# Patient Record
Sex: FEMALE | Race: WHITE | NOT HISPANIC OR LATINO | Employment: UNEMPLOYED | URBAN - METROPOLITAN AREA
[De-identification: names, ages, dates, MRNs, and addresses within clinical notes are randomized per-mention and may not be internally consistent; named-entity substitution may affect disease eponyms.]

---

## 2024-02-13 ENCOUNTER — OFFICE VISIT (OUTPATIENT)
Dept: UROLOGY | Facility: CLINIC | Age: 54
End: 2024-02-13
Payer: COMMERCIAL

## 2024-02-13 VITALS
BODY MASS INDEX: 34.84 KG/M2 | DIASTOLIC BLOOD PRESSURE: 62 MMHG | WEIGHT: 222 LBS | HEIGHT: 67 IN | HEART RATE: 68 BPM | SYSTOLIC BLOOD PRESSURE: 108 MMHG

## 2024-02-13 DIAGNOSIS — R30.0 DYSURIA: Primary | ICD-10-CM

## 2024-02-13 DIAGNOSIS — Z96.0 URETERAL STENT PRESENT: ICD-10-CM

## 2024-02-13 LAB
BILIRUB BLD-MCNC: ABNORMAL MG/DL
CLARITY, POC: ABNORMAL
COLOR UR: ABNORMAL
EXPIRATION DATE: 1224
GLUCOSE UR STRIP-MCNC: NEGATIVE MG/DL
KETONES UR QL: NEGATIVE
LEUKOCYTE EST, POC: ABNORMAL
Lab: ABNORMAL
NITRITE UR-MCNC: NEGATIVE MG/ML
PH UR: 6 [PH] (ref 5–8)
PROT UR STRIP-MCNC: ABNORMAL MG/DL
RBC # UR STRIP: ABNORMAL /UL
SP GR UR: 1.03 (ref 1–1.03)
URINE VOLUME: 0
UROBILINOGEN UR QL: ABNORMAL

## 2024-02-13 PROCEDURE — 87086 URINE CULTURE/COLONY COUNT: CPT | Performed by: NURSE PRACTITIONER

## 2024-02-13 RX ORDER — GABAPENTIN 600 MG/1
1 TABLET ORAL 3 TIMES DAILY
COMMUNITY
Start: 2023-12-18

## 2024-02-13 RX ORDER — ERGOCALCIFEROL 1.25 MG/1
1 CAPSULE ORAL WEEKLY
COMMUNITY

## 2024-02-13 RX ORDER — OXYCODONE HYDROCHLORIDE 10 MG/1
10 TABLET ORAL EVERY 24 HOURS
COMMUNITY
Start: 2024-01-19

## 2024-02-13 RX ORDER — BUDESONIDE AND FORMOTEROL FUMARATE DIHYDRATE 80; 4.5 UG/1; UG/1
2 AEROSOL RESPIRATORY (INHALATION) 2 TIMES DAILY
COMMUNITY

## 2024-02-13 RX ORDER — BUSPIRONE HYDROCHLORIDE 15 MG/1
1 TABLET ORAL 3 TIMES DAILY
COMMUNITY
Start: 2023-12-18

## 2024-02-13 RX ORDER — QUETIAPINE FUMARATE 50 MG/1
2 TABLET, FILM COATED ORAL
COMMUNITY

## 2024-02-13 RX ORDER — TIZANIDINE 4 MG/1
4 TABLET ORAL EVERY 8 HOURS SCHEDULED
COMMUNITY
Start: 2023-12-18

## 2024-02-13 RX ORDER — ALBUTEROL SULFATE 90 UG/1
2 AEROSOL, METERED RESPIRATORY (INHALATION) 4 TIMES DAILY PRN
COMMUNITY

## 2024-02-13 RX ORDER — IBUPROFEN 800 MG/1
800 TABLET ORAL EVERY 8 HOURS SCHEDULED
COMMUNITY
Start: 2023-09-05

## 2024-02-13 RX ORDER — TOPIRAMATE 100 MG/1
100 TABLET, FILM COATED ORAL 2 TIMES DAILY
COMMUNITY

## 2024-02-13 RX ORDER — PANTOPRAZOLE SODIUM 40 MG/1
1 TABLET, DELAYED RELEASE ORAL DAILY
COMMUNITY

## 2024-02-14 LAB — BACTERIA SPEC AEROBE CULT: NORMAL

## 2024-02-15 ENCOUNTER — TELEPHONE (OUTPATIENT)
Dept: UROLOGY | Facility: CLINIC | Age: 54
End: 2024-02-15
Payer: COMMERCIAL

## 2024-03-16 PROBLEM — Z85.41 HISTORY OF CERVICAL CANCER: Status: ACTIVE | Noted: 2024-03-16

## 2024-03-16 PROBLEM — N13.5 BILATERAL URETERAL OBSTRUCTION: Status: ACTIVE | Noted: 2024-03-16

## 2024-03-16 PROBLEM — N39.0 RECURRENT URINARY TRACT INFECTION: Status: ACTIVE | Noted: 2024-03-16

## 2024-03-16 NOTE — PROGRESS NOTES
Chief Complaint: Urologic complaint    Subjective         History of Present Illness  Nat Durán Case is a 54 y.o. 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  No fevers.  No hospitalizations for UTI    UTIs for the last 9 months.    Currently on Bactrim x 7 days for UTI    Nitrite positive urine today    No trouble with constipation.    PVR    3/24   106    No pelvic radiation.      1 year of recurrent UTIs    Strains to void at times    Symptoms - burning/dysuria/back pain/gross hematuria/frequency/urgency    1/4/2024 Dr. Rick exchange stents    8/23 0.9, GFR 74      Has been having stent exchanges every 4 months    2/22 CT abdomen/pelvis with - no signs of metastatic disease.  Bilateral right greater than left hydronephrosis.  Down to the urinary bladder.  No stones.    4/21 CT abdomen/pelvis without - negative for metastatic disease.  New moderate right hydronephrosis with moderate left hydronephrosis.  No stone or mass.    2019 bilateral ureteral stents -status post hysterectomy with hydro after.    No history of nephrolithiasis  No  family history    COPD, asthma  No anticoagulation  Former smoker      Urine culture    9/12/2023 less than 10,000 colony-forming's per mL mixed urogenital opal  8/22/2023 > 100,000 E. coli resistant to sulfonamide, tetracycline, otherwise sensitive     Op note 1/4/2024: Dr. 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          Bladder Scan interpretation 03/18/2024    Estimation of residual urine via VoAPPs 3000 Helpa Bladder  Scan  MA/nurse performing: RICCI Turner  Residual Urine: 106 ml  Indication: Recurrent urinary tract infection    Bilateral ureteral obstruction    History of cervical cancer   Position: Supine  Examination: Incremental scanning of the suprapubic area using 2.0 MHz transducer using copious amounts of acoustic gel.   Findings: An anechoic area was demonstrated which represented the bladder, with measurement of residual urine as noted. I inspected this myself. In that the residual urine was stable or insignificant, refer to plan for treatment and plan necessary at this time.            Objective     Past Medical History:   Diagnosis Date    Asthma     Back pain     COPD (chronic obstructive pulmonary disease)     Depression     Hip pain     Migraines     Urinary tract infection        Past Surgical History:   Procedure Laterality Date    ANKLE SURGERY Right     HYSTERECTOMY      WRIST SURGERY Left          Current Outpatient Medications:     albuterol sulfate HFA (ProAir HFA) 108 (90 Base) MCG/ACT inhaler, Inhale 2 puffs 4 (Four) Times a Day As Needed., Disp: , Rfl:     budesonide-formoterol (Symbicort) 80-4.5 MCG/ACT inhaler, Inhale 2 puffs 2 (Two) Times a Day., Disp: , Rfl:     busPIRone (BUSPAR) 15 MG tablet, Take 1 tablet by mouth 3 (Three) Times a Day., Disp: , Rfl:     Cariprazine HCl (Vraylar) 1.5 MG capsule capsule, Take 1 capsule by mouth Daily., Disp: , Rfl:     Cyanocobalamin (RA Vitamin B-12) 1000 MCG/ML liquid, Take 1,000 mcg by mouth Daily., Disp: , Rfl:     gabapentin (NEURONTIN) 600 MG tablet, Take 1 tablet by mouth 3 (Three) Times a Day., Disp: , Rfl:     ibuprofen (ADVIL,MOTRIN) 800 MG tablet, Take 1 tablet by mouth Every 8 (Eight) Hours., Disp: , Rfl:     oxyCODONE (ROXICODONE) 10 MG tablet, Take 1 tablet by mouth Daily., Disp: , Rfl:     pantoprazole (PROTONIX) 40 MG EC tablet, Take 1 tablet by mouth Daily., Disp: , Rfl:     QUEtiapine (SEROquel) 50 MG tablet, Take 2 tablets by mouth every night at  bedtime., Disp: , Rfl:     tiZANidine (ZANAFLEX) 4 MG tablet, Take 1 tablet by mouth Every 8 (Eight) Hours., Disp: , Rfl:     topiramate (TOPAMAX) 100 MG tablet, Take 1 tablet by mouth 2 (Two) Times a Day., Disp: , Rfl:     Vitamin D, Ergocalciferol, 19594 units capsule, Take 1 capsule by mouth 1 (One) Time Per Week., Disp: , Rfl:     No Known Allergies     No family history on file.    Social History     Socioeconomic History    Marital status: Legally    Tobacco Use    Smoking status: Former     Types: Cigarettes     Passive exposure: Past    Smokeless tobacco: Never   Vaping Use    Vaping status: Every Day       Vital Signs:   There were no vitals taken for this visit.     Physical exam    Alert and orient x3  Well appearing, well developed, in no acute distress   Unlabored respirations  Nontender/nondistended      Grossly oriented to person, place and time, judgment is intact, normal mood and affect              Assessment and Plan    Diagnoses and all orders for this visit:    1. Recurrent urinary tract infection (Primary)    2. Bilateral ureteral obstruction      Records reviewed today and summarized in the chart    Drinks 6 to 8 glasses of water daily    Counseled to take daily cranberry tablet and consider starting d-mannose    Urine culture    Bactrim DS p.o. BID x 2 weeks.  Risk benefits discussed    CT urology protocol    Follow-up after CT

## 2024-03-18 ENCOUNTER — OFFICE VISIT (OUTPATIENT)
Dept: UROLOGY | Facility: CLINIC | Age: 54
End: 2024-03-18
Payer: COMMERCIAL

## 2024-03-18 VITALS — WEIGHT: 222 LBS | BODY MASS INDEX: 34.84 KG/M2 | HEIGHT: 67 IN | RESPIRATION RATE: 16 BRPM

## 2024-03-18 DIAGNOSIS — Z85.41 HISTORY OF CERVICAL CANCER: ICD-10-CM

## 2024-03-18 DIAGNOSIS — N39.0 RECURRENT URINARY TRACT INFECTION: Primary | ICD-10-CM

## 2024-03-18 DIAGNOSIS — N13.5 BILATERAL URETERAL OBSTRUCTION: ICD-10-CM

## 2024-03-18 LAB
BILIRUB BLD-MCNC: ABNORMAL MG/DL
CLARITY, POC: ABNORMAL
COLOR UR: ABNORMAL
EXPIRATION DATE: ABNORMAL
GLUCOSE UR STRIP-MCNC: ABNORMAL MG/DL
KETONES UR QL: NEGATIVE
LEUKOCYTE EST, POC: ABNORMAL
Lab: ABNORMAL
NITRITE UR-MCNC: POSITIVE MG/ML
PH UR: 5.5 [PH] (ref 5–8)
PROT UR STRIP-MCNC: ABNORMAL MG/DL
RBC # UR STRIP: ABNORMAL /UL
SP GR UR: 1.02 (ref 1–1.03)
SPECIMEN VOL 24H UR: 106 L
UROBILINOGEN UR QL: NORMAL

## 2024-03-18 PROCEDURE — 1159F MED LIST DOCD IN RCRD: CPT | Performed by: UROLOGY

## 2024-03-18 PROCEDURE — 51798 US URINE CAPACITY MEASURE: CPT | Performed by: UROLOGY

## 2024-03-18 PROCEDURE — 87086 URINE CULTURE/COLONY COUNT: CPT | Performed by: UROLOGY

## 2024-03-18 PROCEDURE — 1160F RVW MEDS BY RX/DR IN RCRD: CPT | Performed by: UROLOGY

## 2024-03-18 PROCEDURE — 99214 OFFICE O/P EST MOD 30 MIN: CPT | Performed by: UROLOGY

## 2024-03-18 RX ORDER — SULFAMETHOXAZOLE AND TRIMETHOPRIM 800; 160 MG/1; MG/1
1 TABLET ORAL 2 TIMES DAILY
COMMUNITY
End: 2024-03-18 | Stop reason: SDUPTHER

## 2024-03-18 RX ORDER — SULFAMETHOXAZOLE AND TRIMETHOPRIM 800; 160 MG/1; MG/1
1 TABLET ORAL 2 TIMES DAILY
Qty: 14 TABLET | Refills: 0 | Status: SHIPPED | OUTPATIENT
Start: 2024-03-18 | End: 2024-03-21 | Stop reason: SDUPTHER

## 2024-03-20 ENCOUNTER — TELEPHONE (OUTPATIENT)
Dept: UROLOGY | Facility: CLINIC | Age: 54
End: 2024-03-20
Payer: COMMERCIAL

## 2024-03-20 DIAGNOSIS — N39.0 RECURRENT URINARY TRACT INFECTION: ICD-10-CM

## 2024-03-20 DIAGNOSIS — N13.5 BILATERAL URETERAL OBSTRUCTION: ICD-10-CM

## 2024-03-20 DIAGNOSIS — Z85.41 HISTORY OF CERVICAL CANCER: ICD-10-CM

## 2024-03-20 LAB — BACTERIA SPEC AEROBE CULT: NO GROWTH

## 2024-03-20 NOTE — TELEPHONE ENCOUNTER
Provider: DR. ARMSTRONG    Caller: Sherman Nat Duárn    Relationship to Patient: Self     Phone Number: [phone]    Reason for Call: PHARMACY STATES THERE IS NO PRESCRIPTION FOR AN ANTIBIOTIC    When was the patient last seen: 03/18/24    Notes: MS. FIGUEROA SAW DR. ARMSTRONG ON 03/18/24 AND WAS PRESCRIBED BACTRIM. THE PRESCRIPTION WAS SENT TO [facility], [facility], & [facility] [place] - [address] - [phone]  - [phone] FX AND THE PHARMACY CONFIRMED RECEIPT 03/18/24 @ 1:41 PM.    MS. FIGUEROA'S SISTER ATTEMPTED TO  THE PRESCRIPTION ON 03/19/24 BUT WAS TOLD THAT THEY NEVER RECEIVED IT.

## 2024-03-21 RX ORDER — SULFAMETHOXAZOLE AND TRIMETHOPRIM 800; 160 MG/1; MG/1
1 TABLET ORAL 2 TIMES DAILY
Qty: 28 TABLET | Refills: 0 | Status: SHIPPED | OUTPATIENT
Start: 2024-03-21 | End: 2024-04-04

## 2024-03-28 ENCOUNTER — TELEPHONE (OUTPATIENT)
Dept: UROLOGY | Facility: CLINIC | Age: 54
End: 2024-03-28
Payer: COMMERCIAL

## 2024-03-28 NOTE — TELEPHONE ENCOUNTER
Caller: MARYCARMEN    Relationship to patient: SELF    Best call back number: [phone]    Patient is needing: PT IS SCHEDULED FOR 4/15/24.  PT STATES BACTRIM IS NOT WORKING AND SHE IS EXPERIENCING BURNING CRAMPING PAIN IN BACK AND BLOOD IN URINE.  PLEASE CALL PT TO ADVISE

## 2024-03-29 NOTE — TELEPHONE ENCOUNTER
Called pt to let her know her recent ucx was negative for growth so there is no need for her to be on Bactrim. She will stop that and come to see me on 04/02/24 at 1100 to give a urine that I will send off for a guidance ucx. She verbalized understanding via teach back

## 2024-04-09 ENCOUNTER — HOSPITAL ENCOUNTER (OUTPATIENT)
Dept: CT IMAGING | Facility: HOSPITAL | Age: 54
Discharge: HOME | End: 2024-04-09
Admitting: UROLOGY
Payer: COMMERCIAL

## 2024-04-09 ENCOUNTER — CLINICAL SUPPORT (OUTPATIENT)
Dept: UROLOGY | Facility: CLINIC | Age: 54
End: 2024-04-09
Payer: COMMERCIAL

## 2024-04-09 DIAGNOSIS — N39.0 RECURRENT URINARY TRACT INFECTION: ICD-10-CM

## 2024-04-09 DIAGNOSIS — N39.0 RECURRENT URINARY TRACT INFECTION: Primary | ICD-10-CM

## 2024-04-09 DIAGNOSIS — Z85.41 HISTORY OF CERVICAL CANCER: ICD-10-CM

## 2024-04-09 DIAGNOSIS — N13.5 BILATERAL URETERAL OBSTRUCTION: ICD-10-CM

## 2024-04-09 LAB
CREAT BLDA-MCNC: 1 MG/DL (ref 0.6–1.3)
EGFRCR SERPLBLD CKD-EPI 2021: 67.9 ML/MIN/1.73

## 2024-04-09 PROCEDURE — 74178 CT ABD&PLV WO CNTR FLWD CNTR: CPT

## 2024-04-09 PROCEDURE — 82565 ASSAY OF CREATININE: CPT

## 2024-04-09 PROCEDURE — 25510000001 IOPAMIDOL PER 1 ML: Performed by: UROLOGY

## 2024-04-09 RX ADMIN — IOPAMIDOL 100 ML: 755 INJECTION, SOLUTION INTRAVENOUS at 15:09

## 2024-04-09 NOTE — PROGRESS NOTES
Pt presented to the office to drop off a urine culture so that we can send it off for guidance uti test. Clean urine sample collected, labeled, and will be sent off via [employer].

## 2024-04-13 NOTE — PROGRESS NOTES
Chief Complaint: Urologic complaint    Subjective         History of Present Illness  aNt Durán Case is a 54 y.o. 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  Small-volume voiding.  Burning/dysuria.    Also having bladder spasms.      4/24  Bactrim DS p.o. BID x 2 weeks - did not help    4/24 guidance culture - Ureaplasma less than 10,000.     4/24 CT uro-- bilateral ureteral stents.  Both stents with proximal portion in proximal ureter.  Bilateral moderate hydronephrosis.  Small area of decreased enhancement lower pole left kidney, exclude pyelonephritis    4/24 1.0, GFR 67      COPD, asthma  No anticoagulation  Former smoker      PVR    3/24   106      Taking d-mannose daily      Previous    UTI symptoms usually burning/cramping/flank pain sometimes.  No fevers.  No hospitalizations for UTI    UTIs for the last 9 months.    Currently on Bactrim x 7 days for UTI    No trouble with constipation.      No pelvic radiation.      1 year of recurrent UTIs    Strains to void at times    Symptoms - burning/dysuria/back pain/gross hematuria/frequency/urgency    1/4/2024 Dr. Rick exchange stents    8/23 0.9, GFR 74      Has been having stent exchanges every 4 months    2/22 CT abdomen/pelvis with - no signs of metastatic disease.  Bilateral right greater than left hydronephrosis.  Down to the urinary bladder.  No stones.    4/21 CT abdomen/pelvis without - negative for metastatic disease.  New moderate right hydronephrosis with moderate left hydronephrosis.  No stone or mass.    2019 bilateral ureteral stents -status post hysterectomy with hydro after.    No history of nephrolithiasis  No  family history      Urine culture    9/12/2023 less than 10,000 colony-forming's per mL mixed urogenital opal  8/22/2023 > 100,000 E. coli resistant to sulfonamide,  tetracycline, otherwise sensitive     Op note 1/4/2024: Dr. 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          Past Surgical History:   Procedure Laterality Date    ANKLE SURGERY Right     HYSTERECTOMY      WRIST SURGERY Left          Current Outpatient Medications:     albuterol sulfate HFA (ProAir HFA) 108 (90 Base) MCG/ACT inhaler, Inhale 2 puffs 4 (Four) Times a Day As Needed., Disp: , Rfl:     budesonide-formoterol (Symbicort) 80-4.5 MCG/ACT inhaler, Inhale 2 puffs 2 (Two) Times a Day., Disp: , Rfl:     busPIRone (BUSPAR) 15 MG tablet, Take 1 tablet by mouth 3 (Three) Times a Day. (Patient not taking: Reported on 3/18/2024), Disp: , Rfl:     Cariprazine HCl (Vraylar) 1.5 MG capsule capsule, Take 1 capsule by mouth Daily., Disp: , Rfl:     Cyanocobalamin (RA Vitamin B-12) 1000 MCG/ML liquid, Take 1,000 mcg by mouth Daily., Disp: , Rfl:     gabapentin (NEURONTIN) 600 MG tablet, Take 1 tablet by mouth 3 (Three) Times a Day., Disp: , Rfl:     ibuprofen (ADVIL,MOTRIN) 800 MG tablet, Take 1 tablet by mouth Every 8 (Eight) Hours., Disp: , Rfl:     oxyCODONE (ROXICODONE) 10 MG tablet, Take 1 tablet by mouth Daily., Disp: , Rfl:     pantoprazole (PROTONIX) 40 MG EC tablet, Take 1 tablet by mouth Daily., Disp: , Rfl:     QUEtiapine (SEROquel) 50 MG tablet, Take 2 tablets by mouth every night at bedtime., Disp: , Rfl:     tiZANidine (ZANAFLEX) 4 MG tablet, Take 1 tablet by mouth Every 8 (Eight) Hours., Disp: , Rfl:     topiramate (TOPAMAX) 100 MG tablet, Take 1 tablet by mouth 2 (Two) Times a Day., Disp: , Rfl:     Vitamin D, Ergocalciferol, 97026 units capsule, Take 1 capsule by mouth 1 (One) Time Per Week., Disp: , Rfl:     No Known Allergies     No family history  on file.    Social History     Socioeconomic History    Marital status: Legally    Tobacco Use    Smoking status: Former     Types: Cigarettes     Passive exposure: Past    Smokeless tobacco: Never   Vaping Use    Vaping status: Every Day       Vital Signs:   There were no vitals taken for this visit.     Physical exam    Alert and orient x3  Well appearing, well developed, in no acute distress   Unlabored respirations  Nontender/nondistended      Grossly oriented to person, place and time, judgment is intact, normal mood and affect              Assessment and Plan    Diagnoses and all orders for this visit:    1. Recurrent urinary tract infection (Primary)    2. Bilateral ureteral obstruction        Cont 6 to 8 glasses of water daily    Cont d-mannose    Patient having quite a bit of symptoms    At this point we will go ahead and get her set up for cystoscopy with bilateral stent exchange.  I will place her on Levaquin 500 mg daily times 5 days leading up to stent exchange.  Risks and benefits were discussed including bleeding, infection and damage to the urinary system.  We also discussed the risk of anesthesia up to and including death.  Patient voiced understanding and would like to proceed.      We may consider tertiary referral to see if there are any other reconstructive options after stent exchange

## 2024-04-15 ENCOUNTER — OFFICE VISIT (OUTPATIENT)
Dept: UROLOGY | Facility: CLINIC | Age: 54
End: 2024-04-15
Payer: COMMERCIAL

## 2024-04-15 ENCOUNTER — PREP FOR SURGERY (OUTPATIENT)
Dept: OTHER | Facility: HOSPITAL | Age: 54
End: 2024-04-15
Payer: COMMERCIAL

## 2024-04-15 DIAGNOSIS — N13.5 BILATERAL URETERAL OBSTRUCTION: ICD-10-CM

## 2024-04-15 DIAGNOSIS — N13.5 BILATERAL URETERAL OBSTRUCTION: Primary | ICD-10-CM

## 2024-04-15 DIAGNOSIS — N39.0 RECURRENT URINARY TRACT INFECTION: Primary | ICD-10-CM

## 2024-04-15 PROCEDURE — 99214 OFFICE O/P EST MOD 30 MIN: CPT | Performed by: UROLOGY

## 2024-04-15 PROCEDURE — 1160F RVW MEDS BY RX/DR IN RCRD: CPT | Performed by: UROLOGY

## 2024-04-15 PROCEDURE — 1159F MED LIST DOCD IN RCRD: CPT | Performed by: UROLOGY

## 2024-04-15 RX ORDER — LEVOFLOXACIN 5 MG/ML
500 INJECTION, SOLUTION INTRAVENOUS ONCE
OUTPATIENT
Start: 2024-04-15 | End: 2024-04-15

## 2024-04-15 RX ORDER — LEVOFLOXACIN 500 MG/1
500 TABLET, FILM COATED ORAL DAILY
Qty: 5 TABLET | Refills: 0 | Status: SHIPPED | OUTPATIENT
Start: 2024-04-15 | End: 2024-04-20

## 2024-04-15 RX ORDER — SODIUM CHLORIDE 0.9 % (FLUSH) 0.9 %
10 SYRINGE (ML) INJECTION AS NEEDED
OUTPATIENT
Start: 2024-04-15

## 2024-04-15 RX ORDER — SODIUM CHLORIDE 9 MG/ML
100 INJECTION, SOLUTION INTRAVENOUS CONTINUOUS
OUTPATIENT
Start: 2024-04-15

## 2024-04-15 RX ORDER — SODIUM CHLORIDE 9 MG/ML
40 INJECTION, SOLUTION INTRAVENOUS AS NEEDED
OUTPATIENT
Start: 2024-04-15

## 2024-04-15 RX ORDER — SODIUM CHLORIDE 0.9 % (FLUSH) 0.9 %
3 SYRINGE (ML) INJECTION EVERY 12 HOURS SCHEDULED
OUTPATIENT
Start: 2024-04-15

## 2024-04-19 NOTE — PRE-PROCEDURE INSTRUCTIONS
PATIENT INSTRUCTED TO BE:    - NOTHING TO EAT AFTER MIDNIGHT OR CHEW, EXCEPT CAN HAVE CLEAR LIQUIDS 2 HOURS PRIOR TO SURGERY ARRIVAL TIME     - TO HOLD ALL VITAMINS, SUPPLEMENTS, NSAIDS FOR ONE WEEK PRIOR TO THEIR SURGICAL PROCEDURE    - DO NOT TAKE _-------------- 7 DAYS PRIOR TO PROCEDURE PER ANESTHESIA RECOMMENDATIONS/INSTRUCTIONS     - INSTRUCTED PT TO USE SURGICAL SOAP 1 TIME THE NIGHT PRIOR TO SURGERY OR THE AM OF SURGERY.   USE SOAP FROM NECK TO TOES AVOID THEIR FACE, HAIR, AND PRIVATE PARTS. INSTRUCTED NO LOTIONS, JEWELRY, PIERCINGS, OR DEODORANT DAY OF SURGERY    - IF DIABETIC, CHECK BLOOD GLUCOSE IF LESS THAN 70 OR HAVING SYMPTOMS CALL THE PREOP AREA FOR INSTRUCTIONS ON AM OF SURGERY ([phone] )    -INSTRUCTED TO TAKE THE FOLLOWING MEDICATIONS THE DAY OF SURGERY:            INHALERS, VRAYLAR, GABAPENTIN, OXYCODONE, PROTONIX, ZANAFLEX PRN, TOPAMAX PRN       - DO NOT BRING ANY MEDICATIONS WITH YOU TO THE HOSPITAL THE DAY OF SURGERY, EXCEPT IF USE INHALERS. BRING INHALERS DAY OF SURGERY       - BRING CPAP OR BIPAP TO THE HOSPITAL ONLY IF ARE SPENDING THE NIGHT    - DO NOT SMOKE OR VAPE 24 HOURS PRIOR TO PROCEDURE PER ANESTHESIA REQUEST     -MAKE SURE YOU HAVE A RIDE HOME OR SOMEONE TO STAY WITH YOU THE DAY OF THE PROCEDURE AFTER YOU GO HOME    - FOLLOW ANY OTHER INSTRUCTIONS GIVEN TO YOU BY YOUR SURGEON'S OFFICE.     - PREADMISSION TESTING NURSE KARLI RIDER RN AT  [phone] IF HAVE ANY QUESTIONS     PATIENT PROVIDED THE NUMBER FOR PREOP SURGICAL DEPT IF HAD QUESTIONS AFTER HOURS PRIOR TO SURGERY ([phone]   INFORMED PT IF NO ANSWER, LEAVE A MESSAGE AND SOMEONE WILL RETURN THEIR CALL       PATIENT VERBALIZED UNDERSTANDING

## 2024-04-23 ENCOUNTER — ANESTHESIA EVENT (OUTPATIENT)
Dept: PERIOP | Facility: HOSPITAL | Age: 54
End: 2024-04-23
Payer: COMMERCIAL

## 2024-04-24 ENCOUNTER — APPOINTMENT (OUTPATIENT)
Dept: GENERAL RADIOLOGY | Facility: HOSPITAL | Age: 54
End: 2024-04-24
Payer: COMMERCIAL

## 2024-04-24 ENCOUNTER — ANESTHESIA (OUTPATIENT)
Dept: PERIOP | Facility: HOSPITAL | Age: 54
End: 2024-04-24
Payer: COMMERCIAL

## 2024-04-24 ENCOUNTER — HOSPITAL ENCOUNTER (OUTPATIENT)
Facility: HOSPITAL | Age: 54
Setting detail: HOSPITAL OUTPATIENT SURGERY
Discharge: HOME | End: 2024-04-24
Attending: UROLOGY | Admitting: UROLOGY
Payer: COMMERCIAL

## 2024-04-24 VITALS
HEART RATE: 72 BPM | BODY MASS INDEX: 34.15 KG/M2 | RESPIRATION RATE: 18 BRPM | DIASTOLIC BLOOD PRESSURE: 56 MMHG | HEIGHT: 67 IN | WEIGHT: 217.59 LBS | OXYGEN SATURATION: 99 % | TEMPERATURE: 97.3 F | SYSTOLIC BLOOD PRESSURE: 118 MMHG

## 2024-04-24 DIAGNOSIS — N13.5 BILATERAL URETERAL OBSTRUCTION: ICD-10-CM

## 2024-04-24 LAB
QT INTERVAL: 334 MS
QTC INTERVAL: 397 MS

## 2024-04-24 PROCEDURE — 25010000002 PROPOFOL 10 MG/ML EMULSION: Performed by: NURSE ANESTHETIST, CERTIFIED REGISTERED

## 2024-04-24 PROCEDURE — 25010000002 MIDAZOLAM PER 1MG: Performed by: ANESTHESIOLOGY

## 2024-04-24 PROCEDURE — C2617 STENT, NON-COR, TEM W/O DEL: HCPCS | Performed by: UROLOGY

## 2024-04-24 PROCEDURE — 52332 CYSTOSCOPY AND TREATMENT: CPT | Performed by: UROLOGY

## 2024-04-24 PROCEDURE — 25010000002 LEVOFLOXACIN PER 250 MG: Performed by: UROLOGY

## 2024-04-24 PROCEDURE — 93005 ELECTROCARDIOGRAM TRACING: CPT | Performed by: ANESTHESIOLOGY

## 2024-04-24 PROCEDURE — 76000 FLUOROSCOPY <1 HR PHYS/QHP: CPT

## 2024-04-24 PROCEDURE — C1769 GUIDE WIRE: HCPCS | Performed by: UROLOGY

## 2024-04-24 PROCEDURE — 25810000003 LACTATED RINGERS PER 1000 ML: Performed by: ANESTHESIOLOGY

## 2024-04-24 DEVICE — STNT LITHOSTENT 7F 26CM: Type: IMPLANTABLE DEVICE | Site: URETER | Status: FUNCTIONAL

## 2024-04-24 RX ORDER — LIDOCAINE HYDROCHLORIDE 20 MG/ML
INJECTION, SOLUTION EPIDURAL; INFILTRATION; INTRACAUDAL; PERINEURAL AS NEEDED
Status: DISCONTINUED | OUTPATIENT
Start: 2024-04-24 | End: 2024-04-24 | Stop reason: SURG

## 2024-04-24 RX ORDER — ONDANSETRON 2 MG/ML
4 INJECTION INTRAMUSCULAR; INTRAVENOUS ONCE AS NEEDED
Status: DISCONTINUED | OUTPATIENT
Start: 2024-04-24 | End: 2024-04-24 | Stop reason: HOSPADM

## 2024-04-24 RX ORDER — ONDANSETRON 4 MG/1
4 TABLET, ORALLY DISINTEGRATING ORAL ONCE AS NEEDED
Status: DISCONTINUED | OUTPATIENT
Start: 2024-04-24 | End: 2024-04-24 | Stop reason: HOSPADM

## 2024-04-24 RX ORDER — PROMETHAZINE HYDROCHLORIDE 25 MG/1
25 SUPPOSITORY RECTAL ONCE AS NEEDED
Status: DISCONTINUED | OUTPATIENT
Start: 2024-04-24 | End: 2024-04-24 | Stop reason: HOSPADM

## 2024-04-24 RX ORDER — PROMETHAZINE HYDROCHLORIDE 12.5 MG/1
12.5 TABLET ORAL ONCE AS NEEDED
Status: DISCONTINUED | OUTPATIENT
Start: 2024-04-24 | End: 2024-04-24 | Stop reason: HOSPADM

## 2024-04-24 RX ORDER — PROPOFOL 10 MG/ML
VIAL (ML) INTRAVENOUS CONTINUOUS PRN
Status: DISCONTINUED | OUTPATIENT
Start: 2024-04-24 | End: 2024-04-24 | Stop reason: SURG

## 2024-04-24 RX ORDER — OXYCODONE HYDROCHLORIDE 5 MG/1
5 TABLET ORAL
Status: DISCONTINUED | OUTPATIENT
Start: 2024-04-24 | End: 2024-04-24 | Stop reason: HOSPADM

## 2024-04-24 RX ORDER — MIRTAZAPINE 15 MG/1
15 TABLET, ORALLY DISINTEGRATING ORAL NIGHTLY
COMMUNITY

## 2024-04-24 RX ORDER — SODIUM CHLORIDE 0.9 % (FLUSH) 0.9 %
10 SYRINGE (ML) INJECTION AS NEEDED
Status: DISCONTINUED | OUTPATIENT
Start: 2024-04-24 | End: 2024-04-24 | Stop reason: HOSPADM

## 2024-04-24 RX ORDER — SODIUM CHLORIDE 9 MG/ML
40 INJECTION, SOLUTION INTRAVENOUS AS NEEDED
Status: DISCONTINUED | OUTPATIENT
Start: 2024-04-24 | End: 2024-04-24 | Stop reason: HOSPADM

## 2024-04-24 RX ORDER — SODIUM CHLORIDE 0.9 % (FLUSH) 0.9 %
3 SYRINGE (ML) INJECTION EVERY 12 HOURS SCHEDULED
Status: DISCONTINUED | OUTPATIENT
Start: 2024-04-24 | End: 2024-04-24 | Stop reason: HOSPADM

## 2024-04-24 RX ORDER — ACETAMINOPHEN 500 MG
1000 TABLET ORAL ONCE
Status: COMPLETED | OUTPATIENT
Start: 2024-04-24 | End: 2024-04-24

## 2024-04-24 RX ORDER — LEVOFLOXACIN 5 MG/ML
500 INJECTION, SOLUTION INTRAVENOUS ONCE
Status: COMPLETED | OUTPATIENT
Start: 2024-04-24 | End: 2024-04-24

## 2024-04-24 RX ORDER — SODIUM CHLORIDE, SODIUM LACTATE, POTASSIUM CHLORIDE, CALCIUM CHLORIDE 600; 310; 30; 20 MG/100ML; MG/100ML; MG/100ML; MG/100ML
9 INJECTION, SOLUTION INTRAVENOUS CONTINUOUS PRN
Status: DISCONTINUED | OUTPATIENT
Start: 2024-04-24 | End: 2024-04-24 | Stop reason: HOSPADM

## 2024-04-24 RX ORDER — MEPERIDINE HYDROCHLORIDE 25 MG/ML
12.5 INJECTION INTRAMUSCULAR; INTRAVENOUS; SUBCUTANEOUS
Status: DISCONTINUED | OUTPATIENT
Start: 2024-04-24 | End: 2024-04-24 | Stop reason: HOSPADM

## 2024-04-24 RX ORDER — ACETAMINOPHEN 325 MG/1
650 TABLET ORAL ONCE
Status: DISCONTINUED | OUTPATIENT
Start: 2024-04-24 | End: 2024-04-24

## 2024-04-24 RX ORDER — SODIUM CHLORIDE 9 MG/ML
100 INJECTION, SOLUTION INTRAVENOUS CONTINUOUS
Status: DISCONTINUED | OUTPATIENT
Start: 2024-04-24 | End: 2024-04-24 | Stop reason: HOSPADM

## 2024-04-24 RX ORDER — PROMETHAZINE HYDROCHLORIDE 12.5 MG/1
25 TABLET ORAL ONCE AS NEEDED
Status: DISCONTINUED | OUTPATIENT
Start: 2024-04-24 | End: 2024-04-24 | Stop reason: HOSPADM

## 2024-04-24 RX ORDER — MIDAZOLAM HYDROCHLORIDE 2 MG/2ML
2 INJECTION, SOLUTION INTRAMUSCULAR; INTRAVENOUS ONCE
Status: COMPLETED | OUTPATIENT
Start: 2024-04-24 | End: 2024-04-24

## 2024-04-24 RX ADMIN — PROPOFOL 250 MCG/KG/MIN: 10 INJECTION, EMULSION INTRAVENOUS at 07:21

## 2024-04-24 RX ADMIN — SODIUM CHLORIDE, POTASSIUM CHLORIDE, SODIUM LACTATE AND CALCIUM CHLORIDE 9 ML/HR: 600; 310; 30; 20 INJECTION, SOLUTION INTRAVENOUS at 07:02

## 2024-04-24 RX ADMIN — LIDOCAINE HYDROCHLORIDE 60 MG: 20 INJECTION, SOLUTION INTRAVENOUS at 07:21

## 2024-04-24 RX ADMIN — LEVOFLOXACIN 500 MG: 5 INJECTION, SOLUTION INTRAVENOUS at 07:21

## 2024-04-24 RX ADMIN — MIDAZOLAM HYDROCHLORIDE 2 MG: 1 INJECTION, SOLUTION INTRAMUSCULAR; INTRAVENOUS at 07:02

## 2024-04-24 RX ADMIN — ACETAMINOPHEN 500 MG: 500 TABLET ORAL at 07:02

## 2024-04-24 NOTE — DISCHARGE INSTRUCTIONS
DISCHARGE INSTRUCTIONS  CYSTOSCOPY STENT PLACEMENT/EXCHANGE      For your surgery you had:  General anesthesia (you may have a sore throat for the first 24 hours)    You may experience dizziness, drowsiness, or lightheadedness for several hours following surgery.  Do not stay alone today or tonight.  Limit your activity for 24 hours.  You should not drive or operate machinery, drink alcohol, or sign legally binding documents for 24 hours or while you are taking pain medication.  Resume your diet slowly.  Follow any special dietary instructions you may have been given by your doctor.     NOTIFY YOUR DOCTOR IF YOU EXPERIENCE ANY OF THE FOLLOWING:  Temperature greater than 101 degrees Fahrenheit  Shaking Chills  Redness or excessive drainage from incision  Nausea, vomiting and/or pain that is not controlled by prescribed medications  Increase in bleeding or bleeding that is excessive  Unable to urinate in 6 hours after surgery  If unable to reach your doctor, please go to the closest Emergency Room  Strain urine if instructed by physician.  Collect any fragments and take with you on your scheduled appointment. You may pass small blood clots.  Blood in your urine is normal.  It could be light pink to cherry color. Urine will be bloody for several days.  Drink 6-8 glasses of fluid each day to assist with flushing kidneys.  Back pain is common.  It may feel like a dull ache or back spasm. Most of the time pain may decrease a little with medication, but will not go away until stent is removed.  Slight redness or bruising may be noticed on treated side.  If you have difficulty urinating, try sitting in a bathtub of warm water.    If you have a stent, it must be managed by your urologist.  Do NOT forget.  Medications per physician instructions as indicated on After Visit Summary.    Last dose of pain medication was given at:   .    SPECIAL INSTRUCTIONS:

## 2024-04-24 NOTE — ANESTHESIA POSTPROCEDURE EVALUATION
Patient: Nat Durán Case    Procedure Summary       Date: 04/24/24 Room / Location: [facility] OR  / [facility] MAIN OR    Anesthesia Start: 0719 Anesthesia Stop: 0748    Procedure: cystoscopy with bilateral stent exchange (Bilateral) Diagnosis:       Bilateral ureteral obstruction      (Bilateral ureteral obstruction [N13.5])    Surgeons: Skip Mcintyre MD Provider: Jeremie Fonseca MD    Anesthesia Type: general ASA Status: 2            Anesthesia Type: general    Vitals  Vitals Value Taken Time   /60 04/24/24 0812   Temp 36.2 °C (97.1 °F) 04/24/24 0753   Pulse 74 04/24/24 0815   Resp 18 04/24/24 0753   SpO2 98 % 04/24/24 0815   Vitals shown include unfiled device data.        Post Anesthesia Care and Evaluation    Patient location during evaluation: bedside  Patient participation: complete - patient participated  Level of consciousness: awake  Pain score: 2  Pain management: adequate    Airway patency: patent  PONV Status: none  Cardiovascular status: acceptable and stable  Respiratory status: acceptable  Hydration status: acceptable    Comments: An Anesthesiologist personally participated in the most demanding procedures (including induction and emergence if applicable) in the anesthesia plan, monitored the course of anesthesia administration at frequent intervals and remained physically present and available for immediate diagnosis and treatment of emergencies.          
Negative

## 2024-04-24 NOTE — H&P
[facility]   UROLOGY HISTORY AND PHYSICAL    Patient Name: Nat Durán Case  : 1970  MRN: [id number]  Primary Care Physician:  Renita Ely APRN  Date of admission: 2024    Subjective   Subjective     Chief Complaint:     Bilateral ureteral obstruction      HPI:    Nat Durán Case is a 54 y.o. female   Bilateral ureteral obstruction    No change in H&P    Review of Systems     10 systems reviewed and are negative other than what is listed in HPI    Personal History     Past Medical History:   Diagnosis Date    Asthma     Back pain     [facility]    COPD (chronic obstructive pulmonary disease)     Depression     Hip pain     Migraines     Urinary tract infection        Past Surgical History:   Procedure Laterality Date    ANKLE SURGERY Right     HYSTERECTOMY      WRIST SURGERY Left        Family History: family history is not on file. Otherwise pertinent FHx was reviewed and not pertinent to current issue.    Social History:  reports that she has quit smoking. Her smoking use included cigarettes. She has been exposed to tobacco smoke. She has never used smokeless tobacco. She reports that she does not drink alcohol and does not use drugs.    Home Medications:  Cariprazine HCl, Cyanocobalamin, QUEtiapine, Vitamin D (Ergocalciferol), albuterol sulfate HFA, gabapentin, ibuprofen, oxyCODONE, pantoprazole, tiZANidine, and topiramate      Allergies:  No Known Allergies    Objective   Objective     Vitals:   Temp:  [97.8 °F (36.6 °C)] 97.8 °F (36.6 °C)  Heart Rate:  [102] 102  Resp:  [18] 18  BP: (124)/(55) 124/55  Physical Exam    Constitutional: Awake, alert    Respiratory: Clear to auscultation bilaterally, nonlabored respirations    Cardiovascular: RRR, no murmurs, rubs, or gallops, palpable pedal pulses bilaterally   Gastrointestinal: Positive bowel sounds, soft, nontender, nondistended   Musculoskeletal: No bilateral ankle edema, no clubbing or cyanosis to extremities     Result  Review    Result Review:  I have personally reviewed the results from the time of this admission to 4/24/2024 06:19 EDT and agree with these findings:  []  Laboratory  []  Microbiology  []  Radiology  []  EKG/Telemetry   []  Cardiology/Vascular   []  Pathology  []  Old records  []  Other:    Assessment & Plan   Assessment / Plan     Brief Patient Summary:  Nat Durán Case is a 54 y.o. female     Active Hospital Problems:  Active Hospital Problems    Diagnosis     **Bilateral ureteral obstruction        Plan:   Cystoscopy with bilateral ureteral stent exchange.  Risks and benefits were discussed including bleeding, infection and damage to the urinary system.  We also discussed the risk of anesthesia up to and including death.  Patient voiced understanding and would like to proceed.    Electronically signed by Skip Mcintyre MD, 04/24/24, 6:19 AM EDT.

## 2024-04-24 NOTE — ANESTHESIA PREPROCEDURE EVALUATION
Anesthesia Evaluation     Patient summary reviewed and Nursing notes reviewed   no history of anesthetic complications:   NPO Solid Status: > 8 hours  NPO Liquid Status: > 2 hours           Airway   Mallampati: II  TM distance: >3 FB  Neck ROM: full  No difficulty expected  Dental      Pulmonary - normal exam    breath sounds clear to auscultation  (+) COPD, asthma,  Cardiovascular - negative cardio ROS and normal exam  Exercise tolerance: good (4-7 METS)    Rhythm: regular  Rate: normal        Neuro/Psych  (+) headaches, psychiatric history  GI/Hepatic/Renal/Endo - negative ROS     Musculoskeletal     (+) back pain  Abdominal    Substance History - negative use     OB/GYN negative ob/gyn ROS         Other - negative ROS       ROS/Med Hx Other: PAT Nursing Notes unavailable.            OTHERWISE NORMAL ECG -  Sinus rhythm  Borderline low voltage, extremity leads             Anesthesia Plan    ASA 2     general     (Patient understands anesthesia not responsible for dental damage.)  intravenous induction     Anesthetic plan, risks, benefits, and alternatives have been provided, discussed and informed consent has been obtained with: patient.    Use of blood products discussed with patient .    Plan discussed with CRNA.        CODE STATUS:

## 2024-04-24 NOTE — OP NOTE
CYSTOSCOPY URETERAL CATHETER/STENT INSERTION  Procedure Report    Patient Name:  Nat Durán Case  YOB: 1970    Date of Surgery:  4/24/2024      Pre-op Diagnosis:   Bilateral ureteral obstruction [N13.5]       Postop diagnosis:    Same    Procedure/CPT® Codes:      Procedure(s):    cystoscopy with bilateral stent exchange  7 x 26  Lithostent(triangular)    Staff:  Surgeon(s):  Skip Mcintyre MD         Anesthesia: Monitored Anesthesia Care    Estimated Blood Loss: 0 mL    Implants:    Implant Name Type Inv. Item Serial No.  Lot No. LRB No. Used Action   STNT LITHOSTENT 7F 26CM - [id number] Stent STNT LITHOSTENT 7F 26CM  [employer] [id number] Right 1 Implanted   STNT LITHOSTENT 7F 26CM - [id number] Stent STNT LITHOSTENT 7F 26CM  [employer] [id number] Left 1 Implanted       Specimen:          None        Findings:     Normal bladder other than some stent reaction.  Stents did have some moderate calcification on them    Stents exchanged without issue, no string    Complications: none    Description of Procedure:       After informed consent patient taken to the operating room.  Patient was laid supine and placed under general anesthesia by the anesthesia team.  At this point patient was placed in dorsal lithotomy position and prepped and draped in normal sterile fashion.  A multidisciplinary timeout was undertaken documenting the correct patient site and procedure.  At this point a 22 rigid cystoscope was placed into the urethra . Bladder was examined, see findings section next at this point a Glidewire was placed up the right ureter beside the stent.    I also went ahead and placed a sensor wire up the left ureter beside the stent.  This went up well without issue under fluoroscopic guidance    Both stents were removed with a grasper without issue and passed off the field    I went ahead and backloaded the wire through the scope and placed a 7 x 26 triangular stent on the left  without issue.  Good curl in the bladderrecurrent left kidney in the fluoroscopic guidance.  I did did the same thing on the right side without issue.     Patient tolerated the procedure well, he was taken to the postanesthesia care unit without issue.            Skip Mcintyre MD     Date: 4/24/2024  Time: 07:43 EDT

## 2024-05-06 LAB
QT INTERVAL: 334 MS
QTC INTERVAL: 397 MS

## 2024-05-21 NOTE — PROGRESS NOTES
Chief Complaint: Urologic complaint    Subjective         History of Present Illness  Nat Durán Case is a 54 y.o. 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    No GH    Still having some bladder spasm and cramping.      4/24/2024 bilateral ureteral stent exchange 7 x 26 Lithostent -moderate calcification on the stents.    Patient does deal with some constipation,     COPD, asthma  No anticoagulation  Former smoker        Previous    with urgency/frequency.  Small-volume voiding.  Burning/dysuria.    Also having bladder spasms.      4/24  Bactrim DS p.o. BID x 2 weeks - did not help    4/24 guidance culture - Ureaplasma less than 10,000.     4/24 CT uro-- bilateral ureteral stents.  Both stents with proximal portion in proximal ureter.  Bilateral moderate hydronephrosis.  Small area of decreased enhancement lower pole left kidney, exclude pyelonephritis    4/24 1.0, GFR 67      PVR    3/24   106      Taking d-mannose daily      Previous    UTI symptoms usually burning/cramping/flank pain sometimes.  No fevers.  No hospitalizations for UTI    UTIs for the last 9 months.    Currently on Bactrim x 7 days for UTI    No trouble with constipation.      No pelvic radiation.      1 year of recurrent UTIs    Strains to void at times    Symptoms - burning/dysuria/back pain/gross hematuria/frequency/urgency    1/4/2024 Dr. Rick exchange stents    8/23 0.9, GFR 74      Has been having stent exchanges every 4 months    2/22 CT abdomen/pelvis with - no signs of metastatic disease.  Bilateral right greater than left hydronephrosis.  Down to the urinary bladder.  No stones.    4/21 CT abdomen/pelvis without - negative for metastatic disease.  New moderate right hydronephrosis with moderate left hydronephrosis.  No stone or  mass.    2019 bilateral ureteral stents -status post hysterectomy with hydro after.    No history of nephrolithiasis  No  family history      Urine culture    9/12/2023 less than 10,000 colony-forming's per mL mixed urogenital opal  8/22/2023 > 100,000 E. coli resistant to sulfonamide, tetracycline, otherwise sensitive     Op note 1/4/2024: Dr. 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             Assessment and Plan    Diagnoses and all orders for this visit:    1. Recurrent urinary tract infection (Primary)    2. Bilateral ureteral obstruction      Continue to drink plenty of water daily.    Cont d-mannose    Having dysuria - urine culture    Patient is dealing with constipation only about every 3 to 4 days, recommended starting MiraLAX daily.    Patient having some bladder spasms but after discussion we will see if making her constipation gets better with possible treatment of infection and constipation treatment      Patient understands stents cannot stay in place, she will need to switch at least every 3 to 4 months depending on the amount of calcification or could be detrimental to her health and harm her kidneys.    I will see her back in late June to get her set up for stent exchange in July    I am going to refer her to tertiary center for reconstructive urology to see if there would be any further workup warranted or possibility of diversion or  if she need stents forever.

## 2024-05-24 ENCOUNTER — OFFICE VISIT (OUTPATIENT)
Dept: UROLOGY | Facility: CLINIC | Age: 54
End: 2024-05-24
Payer: COMMERCIAL

## 2024-05-24 VITALS — HEIGHT: 67 IN | WEIGHT: 222 LBS | RESPIRATION RATE: 18 BRPM | BODY MASS INDEX: 34.84 KG/M2

## 2024-05-24 DIAGNOSIS — R30.0 DYSURIA: ICD-10-CM

## 2024-05-24 DIAGNOSIS — N13.5 BILATERAL URETERAL OBSTRUCTION: ICD-10-CM

## 2024-05-24 DIAGNOSIS — N39.0 RECURRENT URINARY TRACT INFECTION: Primary | ICD-10-CM

## 2024-05-24 LAB
BILIRUB BLD-MCNC: NEGATIVE MG/DL
CLARITY, POC: CLEAR
COLOR UR: YELLOW
EXPIRATION DATE: ABNORMAL
GLUCOSE UR STRIP-MCNC: NEGATIVE MG/DL
KETONES UR QL: NEGATIVE
LEUKOCYTE EST, POC: ABNORMAL
Lab: ABNORMAL
NITRITE UR-MCNC: NEGATIVE MG/ML
PH UR: 6.5 [PH] (ref 5–8)
PROT UR STRIP-MCNC: ABNORMAL MG/DL
RBC # UR STRIP: ABNORMAL /UL
SP GR UR: 1.03 (ref 1–1.03)
UROBILINOGEN UR QL: ABNORMAL

## 2024-05-24 PROCEDURE — 87086 URINE CULTURE/COLONY COUNT: CPT | Performed by: UROLOGY

## 2024-05-25 LAB — BACTERIA SPEC AEROBE CULT: NORMAL

## 2024-05-30 ENCOUNTER — TELEPHONE (OUTPATIENT)
Dept: UROLOGY | Facility: CLINIC | Age: 54
End: 2024-05-30
Payer: COMMERCIAL

## 2024-05-30 DIAGNOSIS — N39.0 RECURRENT URINARY TRACT INFECTION: Primary | ICD-10-CM

## 2024-05-31 ENCOUNTER — TELEPHONE (OUTPATIENT)
Dept: UROLOGY | Facility: CLINIC | Age: 54
End: 2024-05-31
Payer: COMMERCIAL

## 2024-05-31 NOTE — TELEPHONE ENCOUNTER
Called pt back and informed her that Dr. Mcintyre is out of the office until next week and we will call her back with her culture results after they are reviewed.

## 2024-06-03 NOTE — TELEPHONE ENCOUNTER
Pt is still experiencing symptoms of burning when urinating and urinating frequently. Informed patient we can reorder urine culture as the recent one came back contaminated. Verified lab locations this can be completed at. Patient verbalized understanding.

## 2024-06-22 NOTE — PROGRESS NOTES
Chief Complaint: Urologic complaint    Subjective         History of Present Illness  Nat Durán Case is a 54 y.o. 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  No treatment as of yet.  Was better for a time    No GH    Daily d-mannose    4/24/2024 bilateral ureteral stent exchange 7 x 26 Lithostent -moderate calcification on the stents.    Daily MiraLAX - helping with her constipation    COPD, asthma  No anticoagulation  Former smoker        Previous    with urgency/frequency.  Small-volume voiding.  Burning/dysuria.    Also having bladder spasms.      4/24  Bactrim DS p.o. BID x 2 weeks - did not help    4/24 guidance culture - Ureaplasma less than 10,000.     4/24 CT uro-- bilateral ureteral stents.  Both stents with proximal portion in proximal ureter.  Bilateral moderate hydronephrosis.  Small area of decreased enhancement lower pole left kidney, exclude pyelonephritis    4/24 1.0, GFR 67      PVR    3/24   106      Taking d-mannose daily      Previous    UTI symptoms usually burning/cramping/flank pain sometimes.  No fevers.  No hospitalizations for UTI    UTIs for the last 9 months.    Currently on Bactrim x 7 days for UTI    No trouble with constipation.      No pelvic radiation.      1 year of recurrent UTIs    Strains to void at times    Symptoms - burning/dysuria/back pain/gross hematuria/frequency/urgency    1/4/2024 Dr. Rick exchange stents    8/23 0.9, GFR 74      Has been having stent exchanges every 4 months    2/22 CT abdomen/pelvis with - no signs of metastatic disease.  Bilateral right greater than left hydronephrosis.  Down to the urinary bladder.  No stones.    4/21 CT abdomen/pelvis without - negative for metastatic disease.  New moderate right hydronephrosis with moderate left hydronephrosis.  No  stone or mass.    2019 bilateral ureteral stents -status post hysterectomy with hydro after.    No history of nephrolithiasis  No  family history      Urine culture    9/12/2023 less than 10,000 colony-forming's per mL mixed urogenital opal  8/22/2023 > 100,000 E. coli resistant to sulfonamide, tetracycline, otherwise sensitive     Op note 1/4/2024: Dr. 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             Assessment and Plan    Diagnoses and all orders for this visit:    1. Bilateral ureteral obstruction (Primary)    2. Recurrent urinary tract infection      Continue to drink plenty of water daily.    Cont d-mannose    Dysuria- urine culture    Cont  MiraLAX daily.    We will get her set up for cystoscopy with bilateral stent exchange.  Risks and benefits were discussed including bleeding, infection and damage to the urinary system.  We also discussed the risk of anesthesia up to and including death.  Patient voiced understanding and would like to proceed.        I am going to refer her to tertiary center for reconstructive urology to see if there would be any further workup warranted or possibility of diversion as she will likely need stents forever.

## 2024-06-25 ENCOUNTER — OFFICE VISIT (OUTPATIENT)
Dept: UROLOGY | Facility: CLINIC | Age: 54
End: 2024-06-25
Payer: COMMERCIAL

## 2024-06-25 ENCOUNTER — PREP FOR SURGERY (OUTPATIENT)
Dept: OTHER | Facility: HOSPITAL | Age: 54
End: 2024-06-25
Payer: COMMERCIAL

## 2024-06-25 VITALS — BODY MASS INDEX: 34.84 KG/M2 | HEIGHT: 67 IN | WEIGHT: 222 LBS

## 2024-06-25 DIAGNOSIS — R30.0 DYSURIA: ICD-10-CM

## 2024-06-25 DIAGNOSIS — N13.5 OBSTRUCTION OF BOTH URETERS: Primary | ICD-10-CM

## 2024-06-25 DIAGNOSIS — N39.0 RECURRENT URINARY TRACT INFECTION: ICD-10-CM

## 2024-06-25 DIAGNOSIS — N13.5 BILATERAL URETERAL OBSTRUCTION: Primary | ICD-10-CM

## 2024-06-25 PROCEDURE — 99213 OFFICE O/P EST LOW 20 MIN: CPT | Performed by: UROLOGY

## 2024-06-25 PROCEDURE — 1159F MED LIST DOCD IN RCRD: CPT | Performed by: UROLOGY

## 2024-06-25 PROCEDURE — 1160F RVW MEDS BY RX/DR IN RCRD: CPT | Performed by: UROLOGY

## 2024-06-25 PROCEDURE — 87086 URINE CULTURE/COLONY COUNT: CPT | Performed by: UROLOGY

## 2024-06-25 RX ORDER — SODIUM CHLORIDE 9 MG/ML
40 INJECTION, SOLUTION INTRAVENOUS AS NEEDED
OUTPATIENT
Start: 2024-06-25

## 2024-06-25 RX ORDER — SODIUM CHLORIDE 0.9 % (FLUSH) 0.9 %
3 SYRINGE (ML) INJECTION EVERY 12 HOURS SCHEDULED
OUTPATIENT
Start: 2024-06-25

## 2024-06-25 RX ORDER — SODIUM CHLORIDE 9 MG/ML
100 INJECTION, SOLUTION INTRAVENOUS CONTINUOUS
OUTPATIENT
Start: 2024-06-25

## 2024-06-25 RX ORDER — SODIUM CHLORIDE 0.9 % (FLUSH) 0.9 %
10 SYRINGE (ML) INJECTION AS NEEDED
OUTPATIENT
Start: 2024-06-25

## 2024-06-27 LAB — BACTERIA SPEC AEROBE CULT: NO GROWTH

## 2024-07-24 ENCOUNTER — TELEPHONE (OUTPATIENT)
Dept: UROLOGY | Facility: CLINIC | Age: 54
End: 2024-07-24
Payer: COMMERCIAL

## 2024-07-24 NOTE — TELEPHONE ENCOUNTER
Spoke to pt and reminded her to do ucx prior to surgery. Pt stated she will get this completed on Friday while she is at another appointment.

## 2024-07-26 ENCOUNTER — LAB (OUTPATIENT)
Dept: LAB | Facility: HOSPITAL | Age: 54
End: 2024-07-26
Payer: COMMERCIAL

## 2024-07-26 DIAGNOSIS — N13.5 BILATERAL URETERAL OBSTRUCTION: ICD-10-CM

## 2024-07-26 LAB
BACTERIA UR QL AUTO: ABNORMAL /HPF
BILIRUB UR QL STRIP: NEGATIVE
CLARITY UR: ABNORMAL
COLOR UR: YELLOW
GLUCOSE UR STRIP-MCNC: NEGATIVE MG/DL
HGB UR QL STRIP.AUTO: ABNORMAL
HYALINE CASTS UR QL AUTO: ABNORMAL /LPF
KETONES UR QL STRIP: ABNORMAL
LEUKOCYTE ESTERASE UR QL STRIP.AUTO: ABNORMAL
NITRITE UR QL STRIP: NEGATIVE
PH UR STRIP.AUTO: 7 [PH] (ref 5–8)
PROT UR QL STRIP: ABNORMAL
RBC # UR STRIP: ABNORMAL /HPF
REF LAB TEST METHOD: ABNORMAL
SP GR UR STRIP: 1.02 (ref 1–1.03)
SQUAMOUS #/AREA URNS HPF: ABNORMAL /HPF
UROBILINOGEN UR QL STRIP: ABNORMAL
WBC # UR STRIP: ABNORMAL /HPF

## 2024-07-26 PROCEDURE — 87086 URINE CULTURE/COLONY COUNT: CPT

## 2024-07-26 PROCEDURE — 81001 URINALYSIS AUTO W/SCOPE: CPT

## 2024-07-27 LAB — BACTERIA SPEC AEROBE CULT: NORMAL

## 2024-07-29 RX ORDER — BUPROPION HYDROCHLORIDE 150 MG/1
150 TABLET ORAL DAILY
COMMUNITY
Start: 2024-06-12

## 2024-07-29 RX ORDER — OXCARBAZEPINE 150 MG/1
1 TABLET, FILM COATED ORAL EVERY 12 HOURS SCHEDULED
COMMUNITY
Start: 2024-07-18

## 2024-07-29 RX ORDER — BUDESONIDE AND FORMOTEROL FUMARATE DIHYDRATE 160; 4.5 UG/1; UG/1
2 AEROSOL RESPIRATORY (INHALATION) 2 TIMES DAILY
COMMUNITY
Start: 2024-07-01

## 2024-07-29 RX ORDER — QUETIAPINE FUMARATE 100 MG/1
100 TABLET, FILM COATED ORAL NIGHTLY
COMMUNITY

## 2024-07-29 NOTE — PRE-PROCEDURE INSTRUCTIONS
IMPORTANT INSTRUCTIONS - PRE-ADMISSION TESTING  DO NOT EAT OR CHEW anything after midnight the night before your procedure.    You may have SIPS NO RED LESS THEN 8 OZ CLEAR liquids up to __2____ hours prior to ARRIVAL time.  Take the following medications the morning of your procedure with JUST A SIP OF WATER:  __ALBUTEROL INHALER IF NEEDED AND BRING WITH YOU, BUPROPION, VRAYLAR, GABAPENTIN, OXYCODONE IF NEEDED, PROTONIX, USE SYMBICORT INHALER AND BRING WITH YOU, ZANAFLEX IF NEEDED, TOPAMAX_____________________________________________________________________________________________________________________________________________________________________________________    DO NOT BRING your medications to the hospital with you, UNLESS something has changed since your PRE-Admission Testing appointment.  Hold all vitamins, supplements, and NSAIDS (Non- steroidal anti-inflammatory meds) for one week prior to surgery (you MAY take Tylenol or Acetaminophen).  If you are diabetic, check your blood sugar the morning of your procedure. If it is less than 70 or if you are feeling symptomatic, call the following number for further instructions: [phone] _______.  Use your inhalers/nebulizers as usual, the morning of your procedure. BRING YOUR INHALERS with you.   Bring your CPAP or BIPAP to hospital, ONLY IF YOU WILL BE SPENDING THE NIGHT.   Make sure you have a ride home and have someone who will stay with you the day of your procedure after you go home.  If you have any questions, please call your Pre-Admission Testing Nurse, ___ROBBY_____________ at [phone]____________.   Per anesthesia request, do not smoke for 24 hours before your procedure or as instructed by your surgeon.    WILL CALL ON    7/30/24     NORMALLY BETWEEN 1 AND 4 PM TO GIVE OFFICIAL ARRIVAL TIME FOR DAY OF PROCEDURE  BATHING INSTRUCTIONS GIVEN. NO JEWELRY OF ANY TYPE OR NAIL POLISH UPPER OR LOWER EXT  COME TO ENTRANCE A, ELEVATOR A, 3RD FLOOR DAY  OF PROCEDURE.   NO VAPING 24 HOURS PRIOR TO PROCEDURE

## 2024-07-30 ENCOUNTER — ANESTHESIA EVENT (OUTPATIENT)
Dept: PERIOP | Facility: HOSPITAL | Age: 54
End: 2024-07-30
Payer: COMMERCIAL

## 2024-07-31 ENCOUNTER — APPOINTMENT (OUTPATIENT)
Dept: GENERAL RADIOLOGY | Facility: HOSPITAL | Age: 54
End: 2024-07-31
Payer: COMMERCIAL

## 2024-07-31 ENCOUNTER — HOSPITAL ENCOUNTER (OUTPATIENT)
Facility: HOSPITAL | Age: 54
Discharge: HOME | End: 2024-07-31
Attending: UROLOGY | Admitting: UROLOGY
Payer: COMMERCIAL

## 2024-07-31 ENCOUNTER — ANESTHESIA (OUTPATIENT)
Dept: PERIOP | Facility: HOSPITAL | Age: 54
End: 2024-07-31
Payer: COMMERCIAL

## 2024-07-31 VITALS
DIASTOLIC BLOOD PRESSURE: 59 MMHG | WEIGHT: 236.11 LBS | OXYGEN SATURATION: 98 % | HEIGHT: 67 IN | TEMPERATURE: 98 F | RESPIRATION RATE: 18 BRPM | SYSTOLIC BLOOD PRESSURE: 104 MMHG | HEART RATE: 71 BPM | BODY MASS INDEX: 37.06 KG/M2

## 2024-07-31 DIAGNOSIS — N13.5 OBSTRUCTION OF BOTH URETERS: ICD-10-CM

## 2024-07-31 PROCEDURE — 25810000003 LACTATED RINGERS PER 1000 ML: Performed by: ANESTHESIOLOGY

## 2024-07-31 PROCEDURE — 25010000002 MIDAZOLAM PER 1MG: Performed by: ANESTHESIOLOGY

## 2024-07-31 PROCEDURE — C2617 STENT, NON-COR, TEM W/O DEL: HCPCS | Performed by: UROLOGY

## 2024-07-31 PROCEDURE — 25010000002 PROPOFOL 10 MG/ML EMULSION: Performed by: NURSE ANESTHETIST, CERTIFIED REGISTERED

## 2024-07-31 PROCEDURE — 52332 CYSTOSCOPY AND TREATMENT: CPT | Performed by: UROLOGY

## 2024-07-31 PROCEDURE — 25010000002 CEFAZOLIN PER 500 MG: Performed by: UROLOGY

## 2024-07-31 PROCEDURE — 76000 FLUOROSCOPY <1 HR PHYS/QHP: CPT

## 2024-07-31 PROCEDURE — C1769 GUIDE WIRE: HCPCS | Performed by: UROLOGY

## 2024-07-31 PROCEDURE — 25010000002 DEXAMETHASONE PER 1 MG: Performed by: NURSE ANESTHETIST, CERTIFIED REGISTERED

## 2024-07-31 PROCEDURE — 25010000002 ONDANSETRON PER 1 MG: Performed by: NURSE ANESTHETIST, CERTIFIED REGISTERED

## 2024-07-31 DEVICE — STNT LITHOSTENT 7F 26CM: Type: IMPLANTABLE DEVICE | Site: URETER | Status: FUNCTIONAL

## 2024-07-31 RX ORDER — ONDANSETRON 4 MG/1
4 TABLET, ORALLY DISINTEGRATING ORAL ONCE AS NEEDED
Status: DISCONTINUED | OUTPATIENT
Start: 2024-07-31 | End: 2024-07-31 | Stop reason: HOSPADM

## 2024-07-31 RX ORDER — DEXMEDETOMIDINE HYDROCHLORIDE 100 UG/ML
INJECTION, SOLUTION INTRAVENOUS AS NEEDED
Status: DISCONTINUED | OUTPATIENT
Start: 2024-07-31 | End: 2024-07-31 | Stop reason: SURG

## 2024-07-31 RX ORDER — MIDAZOLAM HYDROCHLORIDE 2 MG/2ML
1 INJECTION, SOLUTION INTRAMUSCULAR; INTRAVENOUS ONCE
Status: COMPLETED | OUTPATIENT
Start: 2024-07-31 | End: 2024-07-31

## 2024-07-31 RX ORDER — LIDOCAINE HYDROCHLORIDE 20 MG/ML
INJECTION, SOLUTION EPIDURAL; INFILTRATION; INTRACAUDAL; PERINEURAL AS NEEDED
Status: DISCONTINUED | OUTPATIENT
Start: 2024-07-31 | End: 2024-07-31 | Stop reason: SURG

## 2024-07-31 RX ORDER — SODIUM CHLORIDE 0.9 % (FLUSH) 0.9 %
3 SYRINGE (ML) INJECTION EVERY 12 HOURS SCHEDULED
Status: DISCONTINUED | OUTPATIENT
Start: 2024-07-31 | End: 2024-07-31 | Stop reason: HOSPADM

## 2024-07-31 RX ORDER — ACETAMINOPHEN 500 MG
1000 TABLET ORAL ONCE
Status: COMPLETED | OUTPATIENT
Start: 2024-07-31 | End: 2024-07-31

## 2024-07-31 RX ORDER — MEPERIDINE HYDROCHLORIDE 25 MG/ML
12.5 INJECTION INTRAMUSCULAR; INTRAVENOUS; SUBCUTANEOUS
Status: DISCONTINUED | OUTPATIENT
Start: 2024-07-31 | End: 2024-07-31 | Stop reason: HOSPADM

## 2024-07-31 RX ORDER — ONDANSETRON 2 MG/ML
INJECTION INTRAMUSCULAR; INTRAVENOUS AS NEEDED
Status: DISCONTINUED | OUTPATIENT
Start: 2024-07-31 | End: 2024-07-31 | Stop reason: SURG

## 2024-07-31 RX ORDER — PROPOFOL 10 MG/ML
VIAL (ML) INTRAVENOUS AS NEEDED
Status: DISCONTINUED | OUTPATIENT
Start: 2024-07-31 | End: 2024-07-31 | Stop reason: SURG

## 2024-07-31 RX ORDER — OXYCODONE HYDROCHLORIDE 5 MG/1
5 TABLET ORAL
Status: DISCONTINUED | OUTPATIENT
Start: 2024-07-31 | End: 2024-07-31 | Stop reason: HOSPADM

## 2024-07-31 RX ORDER — PHENYLEPHRINE HCL IN 0.9% NACL 1 MG/10 ML
SYRINGE (ML) INTRAVENOUS AS NEEDED
Status: DISCONTINUED | OUTPATIENT
Start: 2024-07-31 | End: 2024-07-31 | Stop reason: SURG

## 2024-07-31 RX ORDER — ONDANSETRON 2 MG/ML
4 INJECTION INTRAMUSCULAR; INTRAVENOUS ONCE AS NEEDED
Status: DISCONTINUED | OUTPATIENT
Start: 2024-07-31 | End: 2024-07-31 | Stop reason: HOSPADM

## 2024-07-31 RX ORDER — SODIUM CHLORIDE 0.9 % (FLUSH) 0.9 %
10 SYRINGE (ML) INJECTION AS NEEDED
Status: DISCONTINUED | OUTPATIENT
Start: 2024-07-31 | End: 2024-07-31 | Stop reason: HOSPADM

## 2024-07-31 RX ORDER — SODIUM CHLORIDE 9 MG/ML
100 INJECTION, SOLUTION INTRAVENOUS CONTINUOUS
Status: DISCONTINUED | OUTPATIENT
Start: 2024-07-31 | End: 2024-07-31 | Stop reason: HOSPADM

## 2024-07-31 RX ORDER — ACETAMINOPHEN 325 MG/1
650 TABLET ORAL ONCE
Status: DISCONTINUED | OUTPATIENT
Start: 2024-07-31 | End: 2024-07-31 | Stop reason: HOSPADM

## 2024-07-31 RX ORDER — SODIUM CHLORIDE, SODIUM LACTATE, POTASSIUM CHLORIDE, CALCIUM CHLORIDE 600; 310; 30; 20 MG/100ML; MG/100ML; MG/100ML; MG/100ML
9 INJECTION, SOLUTION INTRAVENOUS CONTINUOUS PRN
Status: DISCONTINUED | OUTPATIENT
Start: 2024-07-31 | End: 2024-07-31 | Stop reason: HOSPADM

## 2024-07-31 RX ORDER — PROMETHAZINE HYDROCHLORIDE 25 MG/1
25 SUPPOSITORY RECTAL ONCE AS NEEDED
Status: DISCONTINUED | OUTPATIENT
Start: 2024-07-31 | End: 2024-07-31 | Stop reason: HOSPADM

## 2024-07-31 RX ORDER — DEXAMETHASONE SODIUM PHOSPHATE 4 MG/ML
INJECTION, SOLUTION INTRA-ARTICULAR; INTRALESIONAL; INTRAMUSCULAR; INTRAVENOUS; SOFT TISSUE AS NEEDED
Status: DISCONTINUED | OUTPATIENT
Start: 2024-07-31 | End: 2024-07-31 | Stop reason: SURG

## 2024-07-31 RX ORDER — PROMETHAZINE HYDROCHLORIDE 12.5 MG/1
25 TABLET ORAL ONCE AS NEEDED
Status: DISCONTINUED | OUTPATIENT
Start: 2024-07-31 | End: 2024-07-31 | Stop reason: HOSPADM

## 2024-07-31 RX ORDER — SODIUM CHLORIDE 9 MG/ML
40 INJECTION, SOLUTION INTRAVENOUS AS NEEDED
Status: DISCONTINUED | OUTPATIENT
Start: 2024-07-31 | End: 2024-07-31 | Stop reason: HOSPADM

## 2024-07-31 RX ORDER — PROMETHAZINE HYDROCHLORIDE 12.5 MG/1
12.5 TABLET ORAL ONCE AS NEEDED
Status: DISCONTINUED | OUTPATIENT
Start: 2024-07-31 | End: 2024-07-31 | Stop reason: HOSPADM

## 2024-07-31 RX ORDER — HYDROCODONE BITARTRATE AND ACETAMINOPHEN 5; 325 MG/1; MG/1
1 TABLET ORAL ONCE AS NEEDED
Status: DISCONTINUED | OUTPATIENT
Start: 2024-07-31 | End: 2024-07-31 | Stop reason: HOSPADM

## 2024-07-31 RX ADMIN — LIDOCAINE HYDROCHLORIDE 100 MG: 20 INJECTION, SOLUTION INTRAVENOUS at 07:12

## 2024-07-31 RX ADMIN — PROPOFOL 250 MCG/KG/MIN: 10 INJECTION, EMULSION INTRAVENOUS at 07:13

## 2024-07-31 RX ADMIN — SODIUM CHLORIDE, POTASSIUM CHLORIDE, SODIUM LACTATE AND CALCIUM CHLORIDE 9 ML/HR: 600; 310; 30; 20 INJECTION, SOLUTION INTRAVENOUS at 06:59

## 2024-07-31 RX ADMIN — MIDAZOLAM HYDROCHLORIDE 1 MG: 1 INJECTION, SOLUTION INTRAMUSCULAR; INTRAVENOUS at 06:55

## 2024-07-31 RX ADMIN — DEXMEDETOMIDINE HYDROCHLORIDE 5 MCG: 100 INJECTION, SOLUTION, CONCENTRATE INTRAVENOUS at 07:13

## 2024-07-31 RX ADMIN — ONDANSETRON 4 MG: 2 INJECTION INTRAMUSCULAR; INTRAVENOUS at 07:37

## 2024-07-31 RX ADMIN — ACETAMINOPHEN 1000 MG: 500 TABLET ORAL at 06:54

## 2024-07-31 RX ADMIN — Medication 200 MCG: at 07:41

## 2024-07-31 RX ADMIN — PROPOFOL 30 MG: 10 INJECTION, EMULSION INTRAVENOUS at 07:12

## 2024-07-31 RX ADMIN — DEXMEDETOMIDINE HYDROCHLORIDE 5 MCG: 100 INJECTION, SOLUTION, CONCENTRATE INTRAVENOUS at 07:21

## 2024-07-31 RX ADMIN — DEXMEDETOMIDINE HYDROCHLORIDE 5 MCG: 100 INJECTION, SOLUTION, CONCENTRATE INTRAVENOUS at 07:27

## 2024-07-31 RX ADMIN — DEXMEDETOMIDINE HYDROCHLORIDE 5 MCG: 100 INJECTION, SOLUTION, CONCENTRATE INTRAVENOUS at 07:17

## 2024-07-31 RX ADMIN — DEXAMETHASONE SODIUM PHOSPHATE 4 MG: 4 INJECTION, SOLUTION INTRAMUSCULAR; INTRAVENOUS at 07:37

## 2024-07-31 RX ADMIN — DEXMEDETOMIDINE HYDROCHLORIDE 5 MCG: 100 INJECTION, SOLUTION, CONCENTRATE INTRAVENOUS at 07:10

## 2024-07-31 RX ADMIN — Medication 200 MCG: at 07:37

## 2024-07-31 RX ADMIN — SODIUM CHLORIDE 2000 MG: 9 INJECTION, SOLUTION INTRAVENOUS at 07:09

## 2024-07-31 RX ADMIN — DEXMEDETOMIDINE HYDROCHLORIDE 5 MCG: 100 INJECTION, SOLUTION, CONCENTRATE INTRAVENOUS at 07:30

## 2024-07-31 NOTE — DISCHARGE INSTRUCTIONS
DISCHARGE INSTRUCTIONS  CYSTOSCOPY STENT PLACEMENT/EXCHANGE      For your surgery you had:    Monitored anesthesia care     You may experience dizziness, drowsiness, or lightheadedness for several hours following surgery.  Do not stay alone today or tonight.  Limit your activity for 24 hours.  You should not drive or operate machinery, drink alcohol, or sign legally binding documents for 24 hours or while you are taking pain medication.  Resume your diet slowly.  Follow any special dietary instructions you may have been given by your doctor.     NOTIFY YOUR DOCTOR IF YOU EXPERIENCE ANY OF THE FOLLOWING:  Temperature greater than 101 degrees Fahrenheit  Shaking Chills  Redness or excessive drainage from incision  Nausea, vomiting and/or pain that is not controlled by prescribed medications  Increase in bleeding or bleeding that is excessive  Unable to urinate in 6 hours after surgery  If unable to reach your doctor, please go to the closest Emergency Room  Strain urine if instructed by physician.  Collect any fragments and take with you on your scheduled appointment. You may pass small blood clots.  Blood in your urine is normal.  It could be light pink to cherry color. Urine will be bloody for several days.  Drink 6-8 glasses of fluid each day to assist with flushing kidneys.  Back pain is common.  It may feel like a dull ache or back spasm. Most of the time pain may decrease a little with medication, but will not go away until stent is removed.  Slight redness or bruising may be noticed on treated side.  If you have difficulty urinating, try sitting in a bathtub of warm water.    If you have a stent, it must be managed by your urologist.  Do NOT forget.  Medications per physician instructions as indicated on After Visit Summary.    Last dose of pain medication was given at:    0654 1000mg Tylenol .    SPECIAL INSTRUCTIONS:

## 2024-07-31 NOTE — H&P
[facility]   UROLOGY HISTORY AND PHYSICAL    Patient Name: Nat Durán Case  : 1970  MRN: [id number]  Primary Care Physician:  Renita Ely APRN  Date of admission: 2024    Subjective   Subjective     Chief Complaint:     Bilateral ureteral obstruction      HPI:    Nat Durán Case is a 54 y.o. female bilateral ureteral obstruction    No change in H&P    Review of Systems     10 systems reviewed and are negative other than what is listed in HPI    Personal History     Past Medical History:   Diagnosis Date    Asthma     Back pain     [facility]    Bilateral ureteral obstruction     stents in place since 2019, changed periodically    COPD (chronic obstructive pulmonary disease)     Depression     Hip pain     Migraines     Pain management     Spinal stenosis of lumbar region     Urinary tract infection        Past Surgical History:   Procedure Laterality Date    ANKLE SURGERY Right     CYSTOSCOPY W/ URETERAL STENT PLACEMENT Bilateral 2024    Procedure: cystoscopy with bilateral stent exchange;  Surgeon: Skip Mcintyre MD;  Location: [facility] OR;  Service: Urology;  Laterality: Bilateral;    HYSTERECTOMY      WRIST SURGERY Left        Family History: family history is not on file. Otherwise pertinent FHx was reviewed and not pertinent to current issue.    Social History:  reports that she has quit smoking. Her smoking use included cigarettes. She has been exposed to tobacco smoke. She has never used smokeless tobacco. She reports that she does not drink alcohol and does not use drugs.    Home Medications:  Cariprazine HCl, Cyanocobalamin, OXcarbazepine, QUEtiapine, Vitamin D (Ergocalciferol), albuterol sulfate HFA, buPROPion XL, budesonide-formoterol, gabapentin, ibuprofen, mirtazapine, oxyCODONE, pantoprazole, tiZANidine, and topiramate      Allergies:  No Known Allergies    Objective   Objective     Vitals:   Temp:  [99 °F (37.2 °C)] 99 °F (37.2 °C)  Heart Rate:  [88]  88  Resp:  [20] 20  BP: (114)/(94) 114/94  Physical Exam    Constitutional: Awake, alert    Respiratory: Clear to auscultation bilaterally, nonlabored respirations    Cardiovascular: RRR, no murmurs, rubs, or gallops, palpable pedal pulses bilaterally   Gastrointestinal: Positive bowel sounds, soft, nontender, nondistended   Musculoskeletal: No bilateral ankle edema, no clubbing or cyanosis to extremities     Result Review    Result Review:  I have personally reviewed the results from the time of this admission to 7/31/2024 06:39 EDT and agree with these findings:  []  Laboratory  []  Microbiology  []  Radiology  []  EKG/Telemetry   []  Cardiology/Vascular   []  Pathology  []  Old records  []  Other:    Assessment & Plan   Assessment / Plan     Brief Patient Summary:  Nat Whitaker is a 54 y.o. female     Active Hospital Problems:  Active Hospital Problems    Diagnosis     **Ureteral obstruction        Cystoscopy with bilateral ureteral stent exchange   risks and benefits were discussed including bleeding, infection and damage to the urinary system.  We also discussed the risk of anesthesia up to and including death.  Patient voiced understanding and would like to proceed.    Electronically signed by Skip Mcintyre MD, 07/31/24, 6:39 AM EDT.

## 2024-07-31 NOTE — ANESTHESIA POSTPROCEDURE EVALUATION
Patient: Nat Durán Case    Procedure Summary       Date: 07/31/24 Room / Location: [facility] OR  / [facility] MAIN OR    Anesthesia Start: 0709 Anesthesia Stop: 0748    Procedure: Cystoscopy with bilateral ureteral stent exchange (Bilateral) Diagnosis:       Obstruction of both ureters      (Obstruction of both ureters [N13.5])    Surgeons: Skip Mcintyre MD Provider: Anahi Tobar MD    Anesthesia Type: general ASA Status: 2            Anesthesia Type: general    Vitals  Vitals Value Taken Time   BP 94/56 07/31/24 0840   Temp 36.6 °C (97.8 °F) 07/31/24 0815   Pulse 70 07/31/24 0844   Resp 20 07/31/24 0815   SpO2 93 % 07/31/24 0844   Vitals shown include unfiled device data.        Post Anesthesia Care and Evaluation    Patient location during evaluation: bedside  Patient participation: complete - patient participated  Level of consciousness: awake  Pain management: adequate    Airway patency: patent  PONV Status: none  Cardiovascular status: acceptable and stable  Respiratory status: acceptable  Hydration status: acceptable

## 2024-07-31 NOTE — ANESTHESIA PREPROCEDURE EVALUATION
Anesthesia Evaluation     Patient summary reviewed and Nursing notes reviewed   no history of anesthetic complications:   NPO Solid Status: > 8 hours  NPO Liquid Status: > 2 hours           Airway   Mallampati: II  TM distance: >3 FB  Neck ROM: full  No difficulty expected  Dental    (+) edentulous    Pulmonary - normal exam    breath sounds clear to auscultation  (+) a smoker (quit in Jan) Former, COPD,  Cardiovascular - normal exam  Exercise tolerance: good (4-7 METS)    ECG reviewed  Rhythm: regular  Rate: normal    (+) hypertension, hyperlipidemia      Neuro/Psych  (+) headaches  GI/Hepatic/Renal/Endo    (+) GERD well controlled, renal disease- stones    Musculoskeletal     (+) back pain  Abdominal    Substance History - negative use     OB/GYN negative ob/gyn ROS         Other - negative ROS       ROS/Med Hx Other: PAT Nursing Notes unavailable.                Anesthesia Plan    ASA 2     general     (Patient understands anesthesia not responsible for dental damage.)  intravenous induction     Anesthetic plan, risks, benefits, and alternatives have been provided, discussed and informed consent has been obtained with: patient.    Use of blood products discussed with patient .    Plan discussed with CRNA.    CODE STATUS:

## 2024-07-31 NOTE — OP NOTE
CYSTOSCOPY URETERAL CATHETER/STENT INSERTION  Procedure Report    Patient Name:  Nat Durán Case  YOB: 1970    Date of Surgery:  7/31/2024      Pre-op Diagnosis:   Obstruction of both ureters [N13.5]       Postop diagnosis:    Same    Procedure/CPT® Codes:      Procedure(s):      cystoscopy with bilateral stent exchange  7 x 26  Lithostent(triangular)    Staff:  Surgeon(s):  Skip Mcintyre MD         Anesthesia: Monitored Anesthesia Care    Estimated Blood Loss: minimal    Implants:    Implant Name Type Inv. Item Serial No.  Lot No. LRB No. Used Action   STNT LITHOSTENT 7F 26CM - [id number] Stent STNT LITHOSTENT 7F 26CM  [employer] [id number] 1 Implanted   STNT LITHOSTENT 7F 26CM - [id number] Stent STNT LITHOSTENT 7F 26CM  [employer] [id number] Right 1 Implanted       Specimen:          None        Findings:      Normal bladder other than some stent reaction.  Stents did have some moderate calcification on them     Stents exchanged without issue, no string     Complications: none     Description of Procedure:         After informed consent patient taken to the operating room.  Patient was laid supine and placed under general anesthesia by the anesthesia team.  At this point patient was placed in dorsal lithotomy position and prepped and draped in normal sterile fashion.  A multidisciplinary timeout was undertaken documenting the correct patient site and procedure.  At this point a 22 rigid cystoscope was placed into the urethra . Bladder was examined, see findings section next at this point a Glidewire was placed up the right ureter beside the stent.     I also went ahead and placed a sensor wire up the left ureter beside the stent.  This went up well without issue under fluoroscopic guidance     Both stents were removed with a grasper without issue and passed off the field     I went ahead and backloaded the wire through the scope and placed a 7 x 26 triangular stent on the  left without issue.  Good curl in the bladderrecurrent left kidney in the fluoroscopic guidance.  I did did the same thing on the right side without issue.     I did place a 20 Nepali two-way catheter with 10 mL sterile water in the balloon at the end secondary to a little bit of urethral bleeding     Patient tolerated the procedure well, he was taken to the postanesthesia care unit without issue.          Skip Mcintyre MD     Date: 7/31/2024  Time: 07:36 EDT

## 2024-11-20 ENCOUNTER — TELEPHONE (OUTPATIENT)
Dept: UROLOGY | Age: 54
End: 2024-11-20
Payer: COMMERCIAL

## 2024-11-21 ENCOUNTER — TELEPHONE (OUTPATIENT)
Dept: UROLOGY | Age: 54
End: 2024-11-21
Payer: COMMERCIAL

## 2024-11-21 NOTE — PROGRESS NOTES
Chief Complaint: Urologic complaint    Subjective         History of Present Illness  Nat Durán Case is a 54 y.o. 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  She has had some gross hematuria bladder spasms and burning.    Treated with Bactrim x 1 since that time    Symptoms are usually better a few weeks after she gets her stent exchange before start having problems again      Previous    7/31/2024 cystoscopy with bilateral stent exchange  7 x 26  Lithostent(triangular)        4/24/2024 bilateral ureteral stent exchange 7 x 26 Lithostent -moderate calcification on the stents.    Daily MiraLAX - helping with her constipation    COPD, asthma  No anticoagulation  Former smoker        Previous    with urgency/frequency.  Small-volume voiding.  Burning/dysuria.    Also having bladder spasms.      4/24  Bactrim DS p.o. BID x 2 weeks - did not help    4/24 guidance culture - Ureaplasma less than 10,000.     4/24 CT uro-- bilateral ureteral stents.  Both stents with proximal portion in proximal ureter.  Bilateral moderate hydronephrosis.  Small area of decreased enhancement lower pole left kidney, exclude pyelonephritis    4/24 1.0, GFR 67      PVR    3/24   106      Taking d-mannose daily      Previous    UTI symptoms usually burning/cramping/flank pain sometimes.  No fevers.  No hospitalizations for UTI    UTIs for the last 9 months.    Currently on Bactrim x 7 days for UTI    No trouble with constipation.      No pelvic radiation.      1 year of recurrent UTIs    Strains to void at times    Symptoms -  burning/dysuria/back pain/gross hematuria/frequency/urgency    1/4/2024 Dr. Rick exchange stents    8/23 0.9, GFR 74      Has been having stent exchanges every 4 months    2/22 CT abdomen/pelvis with - no signs of metastatic disease.  Bilateral right greater than left hydronephrosis.  Down to the urinary bladder.  No stones.    4/21 CT abdomen/pelvis without - negative for metastatic disease.  New moderate right hydronephrosis with moderate left hydronephrosis.  No stone or mass.    2019 bilateral ureteral stents -status post hysterectomy with hydro after.    No history of nephrolithiasis  No  family history      Urine culture    9/12/2023 less than 10,000 colony-forming's per mL mixed urogenital opal  8/22/2023 > 100,000 E. coli resistant to sulfonamide, tetracycline, otherwise sensitive     Op note 1/4/2024: Dr. 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             Assessment and Plan    Diagnoses and all orders for this visit:    1. Recurrent urinary tract infection (Primary)    2. Bilateral ureteral obstruction        Continue to drink plenty of fluids/mainly water      Dysuria-urine culture today      Patient understands stents cannot stay in place without being exchanged periodically.  This could damage kidney.  She is aware  - she has stent exchange already scheduled with urologist in [place].  She will keep this appoint      Follow-up as needed

## 2024-11-21 NOTE — TELEPHONE ENCOUNTER
Hub staff attempted to follow warm transfer process and was unsuccessful     Caller: MARYCARMEN FIGUEROA    Relationship to patient: DARRYL    Best call back number: [phone] (home)       Patient is needing: PT STATES THAT HAS STENT EXCHANGE ON 12/13/24 DAY AT [facility]. PT WOULD LIKE TO KNOW IF  WOULD STILL LIKE TO SEE ON 11/25/24 OR WAIT UNTIL AFTER THE EXCHANGE  PLEASE CALL PT BACK TO [facility] ON SITUATION. THANK YOU

## 2024-11-22 NOTE — TELEPHONE ENCOUNTER
Spoke to patient, confirmed that we need to follow up post op. She verbalized understanding of information.

## 2024-11-25 ENCOUNTER — OFFICE VISIT (OUTPATIENT)
Dept: UROLOGY | Age: 54
End: 2024-11-25
Payer: COMMERCIAL

## 2024-11-25 VITALS — BODY MASS INDEX: 37.04 KG/M2 | HEIGHT: 67 IN | RESPIRATION RATE: 18 BRPM | WEIGHT: 236 LBS

## 2024-11-25 DIAGNOSIS — N13.5 BILATERAL URETERAL OBSTRUCTION: ICD-10-CM

## 2024-11-25 DIAGNOSIS — R30.0 DYSURIA: ICD-10-CM

## 2024-11-25 DIAGNOSIS — N39.0 RECURRENT URINARY TRACT INFECTION: Primary | ICD-10-CM

## 2024-11-25 PROCEDURE — 99213 OFFICE O/P EST LOW 20 MIN: CPT | Performed by: UROLOGY

## 2024-11-25 PROCEDURE — 87077 CULTURE AEROBIC IDENTIFY: CPT | Performed by: UROLOGY

## 2024-11-25 PROCEDURE — 87186 SC STD MICRODIL/AGAR DIL: CPT | Performed by: UROLOGY

## 2024-11-25 PROCEDURE — 1159F MED LIST DOCD IN RCRD: CPT | Performed by: UROLOGY

## 2024-11-25 PROCEDURE — 1160F RVW MEDS BY RX/DR IN RCRD: CPT | Performed by: UROLOGY

## 2024-11-25 PROCEDURE — 87086 URINE CULTURE/COLONY COUNT: CPT | Performed by: UROLOGY

## 2024-11-25 RX ORDER — HYDROCODONE BITARTRATE AND ACETAMINOPHEN 7.5; 325 MG/1; MG/1
1 TABLET ORAL
COMMUNITY
Start: 2024-11-11 | End: 2024-12-11

## 2024-11-26 ENCOUNTER — TELEPHONE (OUTPATIENT)
Dept: UROLOGY | Age: 54
End: 2024-11-26

## 2024-11-26 DIAGNOSIS — R30.0 DYSURIA: Primary | ICD-10-CM

## 2024-11-26 NOTE — TELEPHONE ENCOUNTER
INFORMED PATIENT PER DR MCINTYRE THAT CULTURE IS CONTAMINATED. PATIENT IS CURRENTLY HAVING SYMPTOMS. PATIENT WILL DROP OFF A CULTURE AT THE LAB.       ----- Message from Skip Mcintyre sent at 11/26/2024  2:23 PM EST -----  Regarding: urine    You can let patient know her urine culture showed contamination, if she still having problems can drop off another 1  ----- Message -----  From: Lab, Background User  Sent: 11/26/2024   1:46 PM EST  To: Skip Mcintyre MD

## 2024-11-27 ENCOUNTER — TELEPHONE (OUTPATIENT)
Dept: UROLOGY | Age: 54
End: 2024-11-27
Payer: COMMERCIAL

## 2024-11-27 DIAGNOSIS — N39.0 RECURRENT URINARY TRACT INFECTION: Primary | ICD-10-CM

## 2024-11-27 LAB — BACTERIA SPEC AEROBE CULT: ABNORMAL

## 2024-11-27 RX ORDER — LEVOFLOXACIN 500 MG/1
500 TABLET, FILM COATED ORAL DAILY
Qty: 7 TABLET | Refills: 0 | Status: SHIPPED | OUTPATIENT
Start: 2024-11-27 | End: 2024-12-04

## 2024-11-27 NOTE — TELEPHONE ENCOUNTER
INFORMED PATIENT OF URINE RESULTS. INFORMED HER PER DR MCINTYRE WE ARE SENDING IN LEVAQUIN 500MG DAILY FOR 7 DAYS. SHE VERBALIZED UNDERSTANDING OF INFORMATION. PHARMACY VERIFIED.       ----- Message from Skip Mcintyre sent at 11/27/2024 10:34 AM EST -----  Regarding: abx's    11/27/2024 25,000 Enterococcus -place  on Levaquin 500 milligrams daily x 7 days  ----- Message -----  From: Lab, Background User  Sent: 11/26/2024   1:46 PM EST  To: Skip Mcintyre MD

## 2025-03-19 PROCEDURE — 99291 CRITICAL CARE FIRST HOUR: CPT

## 2025-03-19 PROCEDURE — 36415 COLL VENOUS BLD VENIPUNCTURE: CPT

## 2025-03-19 RX ORDER — SODIUM CHLORIDE 0.9 % (FLUSH) 0.9 %
10 SYRINGE (ML) INJECTION AS NEEDED
Status: DISCONTINUED | OUTPATIENT
Start: 2025-03-19 | End: 2025-03-27 | Stop reason: HOSPADM

## 2025-03-20 ENCOUNTER — HOSPITAL ENCOUNTER (INPATIENT)
Facility: HOSPITAL | Age: 55
LOS: 7 days | Discharge: HOME | DRG: 659 | End: 2025-03-27
Attending: EMERGENCY MEDICINE | Admitting: FAMILY MEDICINE
Payer: MEDICAID

## 2025-03-20 ENCOUNTER — APPOINTMENT (OUTPATIENT)
Dept: ULTRASOUND IMAGING | Facility: HOSPITAL | Age: 55
DRG: 659 | End: 2025-03-20
Payer: MEDICAID

## 2025-03-20 ENCOUNTER — APPOINTMENT (OUTPATIENT)
Dept: CT IMAGING | Facility: HOSPITAL | Age: 55
DRG: 659 | End: 2025-03-20
Payer: MEDICAID

## 2025-03-20 DIAGNOSIS — R26.2 DIFFICULTY WALKING: ICD-10-CM

## 2025-03-20 DIAGNOSIS — Z78.9 DECREASED ACTIVITIES OF DAILY LIVING (ADL): ICD-10-CM

## 2025-03-20 DIAGNOSIS — N39.0 RECURRENT UTI: ICD-10-CM

## 2025-03-20 DIAGNOSIS — N13.5 OBSTRUCTION OF BOTH URETERS: ICD-10-CM

## 2025-03-20 DIAGNOSIS — N10 ACUTE PYELONEPHRITIS: Primary | ICD-10-CM

## 2025-03-20 PROBLEM — R41.82 AMS (ALTERED MENTAL STATUS): Status: ACTIVE | Noted: 2025-03-20

## 2025-03-20 PROBLEM — R53.1 GENERALIZED WEAKNESS: Status: ACTIVE | Noted: 2025-03-20

## 2025-03-20 LAB
ALBUMIN SERPL-MCNC: 3.3 G/DL (ref 3.5–5.2)
ALBUMIN/GLOB SERPL: 0.7 G/DL
ALP SERPL-CCNC: 189 U/L (ref 39–117)
ALT SERPL W P-5'-P-CCNC: 123 U/L (ref 1–33)
AMMONIA BLD-SCNC: 17 UMOL/L (ref 11–51)
ANION GAP SERPL CALCULATED.3IONS-SCNC: 14.5 MMOL/L (ref 5–15)
AST SERPL-CCNC: 201 U/L (ref 1–32)
BACTERIA UR QL AUTO: ABNORMAL /HPF
BASOPHILS # BLD AUTO: 0.04 10*3/MM3 (ref 0–0.2)
BASOPHILS NFR BLD AUTO: 0.4 % (ref 0–1.5)
BILIRUB SERPL-MCNC: 0.4 MG/DL (ref 0–1.2)
BILIRUB UR QL STRIP: NEGATIVE
BUN SERPL-MCNC: 49 MG/DL (ref 6–20)
BUN/CREAT SERPL: 25.4 (ref 7–25)
CALCIUM SPEC-SCNC: 9.5 MG/DL (ref 8.6–10.5)
CHLORIDE SERPL-SCNC: 96 MMOL/L (ref 98–107)
CLARITY UR: ABNORMAL
CO2 SERPL-SCNC: 20.5 MMOL/L (ref 22–29)
COLOR UR: ABNORMAL
CREAT SERPL-MCNC: 1.93 MG/DL (ref 0.57–1)
D-LACTATE SERPL-SCNC: 1.6 MMOL/L (ref 0.5–2)
DEPRECATED RDW RBC AUTO: 45.3 FL (ref 37–54)
EGFRCR SERPLBLD CKD-EPI 2021: 30.7 ML/MIN/1.73
EOSINOPHIL # BLD AUTO: 0 10*3/MM3 (ref 0–0.4)
EOSINOPHIL NFR BLD AUTO: 0 % (ref 0.3–6.2)
ERYTHROCYTE [DISTWIDTH] IN BLOOD BY AUTOMATED COUNT: 15.8 % (ref 12.3–15.4)
FLUAV RNA RESP QL NAA+PROBE: NOT DETECTED
FLUBV RNA RESP QL NAA+PROBE: NOT DETECTED
GLOBULIN UR ELPH-MCNC: 4.7 GM/DL
GLUCOSE BLDC GLUCOMTR-MCNC: 131 MG/DL (ref 70–99)
GLUCOSE SERPL-MCNC: 142 MG/DL (ref 65–99)
GLUCOSE UR STRIP-MCNC: NEGATIVE MG/DL
HAV IGM SERPL QL IA: NORMAL
HBA1C MFR BLD: 5.8 % (ref 4.8–5.6)
HBV CORE IGM SERPL QL IA: NORMAL
HBV SURFACE AG SERPL QL IA: NORMAL
HCG INTACT+B SERPL-ACNC: 3.22 MIU/ML
HCT VFR BLD AUTO: 31.1 % (ref 34–46.6)
HCV AB SER QL: NORMAL
HGB BLD-MCNC: 9.4 G/DL (ref 12–15.9)
HGB UR QL STRIP.AUTO: ABNORMAL
HOLD SPECIMEN: NORMAL
HOLD SPECIMEN: NORMAL
HYALINE CASTS UR QL AUTO: ABNORMAL /LPF
IMM GRANULOCYTES # BLD AUTO: 0.13 10*3/MM3 (ref 0–0.05)
IMM GRANULOCYTES NFR BLD AUTO: 1.3 % (ref 0–0.5)
KETONES UR QL STRIP: ABNORMAL
LEUKOCYTE ESTERASE UR QL STRIP.AUTO: ABNORMAL
LIPASE SERPL-CCNC: 30 U/L (ref 13–60)
LYMPHOCYTES # BLD AUTO: 0.56 10*3/MM3 (ref 0.7–3.1)
LYMPHOCYTES NFR BLD AUTO: 5.4 % (ref 19.6–45.3)
MCH RBC QN AUTO: 24.1 PG (ref 26.6–33)
MCHC RBC AUTO-ENTMCNC: 30.2 G/DL (ref 31.5–35.7)
MCV RBC AUTO: 79.7 FL (ref 79–97)
MONOCYTES # BLD AUTO: 1.14 10*3/MM3 (ref 0.1–0.9)
MONOCYTES NFR BLD AUTO: 11 % (ref 5–12)
NEUTROPHILS NFR BLD AUTO: 8.49 10*3/MM3 (ref 1.7–7)
NEUTROPHILS NFR BLD AUTO: 81.9 % (ref 42.7–76)
NITRITE UR QL STRIP: NEGATIVE
NRBC BLD AUTO-RTO: 0 /100 WBC (ref 0–0.2)
PH UR STRIP.AUTO: 6 [PH] (ref 5–8)
PLATELET # BLD AUTO: 234 10*3/MM3 (ref 140–450)
PMV BLD AUTO: 12.7 FL (ref 6–12)
POTASSIUM SERPL-SCNC: 3.6 MMOL/L (ref 3.5–5.2)
PROCALCITONIN SERPL-MCNC: 1.4 NG/ML (ref 0–0.25)
PROT SERPL-MCNC: 8 G/DL (ref 6–8.5)
PROT UR QL STRIP: ABNORMAL
RBC # BLD AUTO: 3.9 10*6/MM3 (ref 3.77–5.28)
RBC # UR STRIP: ABNORMAL /HPF
RBC MORPH BLD: NORMAL
REF LAB TEST METHOD: ABNORMAL
RSV RNA RESP QL NAA+PROBE: NOT DETECTED
SARS-COV-2 RNA RESP QL NAA+PROBE: NOT DETECTED
SMALL PLATELETS BLD QL SMEAR: ADEQUATE
SODIUM SERPL-SCNC: 131 MMOL/L (ref 136–145)
SP GR UR STRIP: 1.01 (ref 1–1.03)
SQUAMOUS #/AREA URNS HPF: ABNORMAL /HPF
UROBILINOGEN UR QL STRIP: ABNORMAL
WBC # UR STRIP: ABNORMAL /HPF
WBC MORPH BLD: NORMAL
WBC NRBC COR # BLD AUTO: 10.36 10*3/MM3 (ref 3.4–10.8)
WHOLE BLOOD HOLD COAG: NORMAL
WHOLE BLOOD HOLD SPECIMEN: NORMAL

## 2025-03-20 PROCEDURE — 25010000002 VANCOMYCIN 5 G RECONSTITUTED SOLUTION: Performed by: INTERNAL MEDICINE

## 2025-03-20 PROCEDURE — 25810000003 SODIUM CHLORIDE 0.9 % SOLUTION: Performed by: EMERGENCY MEDICINE

## 2025-03-20 PROCEDURE — 82948 REAGENT STRIP/BLOOD GLUCOSE: CPT

## 2025-03-20 PROCEDURE — 87040 BLOOD CULTURE FOR BACTERIA: CPT | Performed by: EMERGENCY MEDICINE

## 2025-03-20 PROCEDURE — 81001 URINALYSIS AUTO W/SCOPE: CPT | Performed by: EMERGENCY MEDICINE

## 2025-03-20 PROCEDURE — 84145 PROCALCITONIN (PCT): CPT | Performed by: INTERNAL MEDICINE

## 2025-03-20 PROCEDURE — 94640 AIRWAY INHALATION TREATMENT: CPT

## 2025-03-20 PROCEDURE — 80053 COMPREHEN METABOLIC PANEL: CPT | Performed by: EMERGENCY MEDICINE

## 2025-03-20 PROCEDURE — 25010000002 CEFTRIAXONE PER 250 MG: Performed by: EMERGENCY MEDICINE

## 2025-03-20 PROCEDURE — 94799 UNLISTED PULMONARY SVC/PX: CPT

## 2025-03-20 PROCEDURE — 87637 SARSCOV2&INF A&B&RSV AMP PRB: CPT | Performed by: EMERGENCY MEDICINE

## 2025-03-20 PROCEDURE — 87086 URINE CULTURE/COLONY COUNT: CPT | Performed by: EMERGENCY MEDICINE

## 2025-03-20 PROCEDURE — 25010000002 PIPERACILLIN SOD-TAZOBACTAM PER 1 G: Performed by: INTERNAL MEDICINE

## 2025-03-20 PROCEDURE — 83036 HEMOGLOBIN GLYCOSYLATED A1C: CPT | Performed by: FAMILY MEDICINE

## 2025-03-20 PROCEDURE — 25810000003 SODIUM CHLORIDE 0.9 % SOLUTION: Performed by: INTERNAL MEDICINE

## 2025-03-20 PROCEDURE — 94664 DEMO&/EVAL PT USE INHALER: CPT

## 2025-03-20 PROCEDURE — 76705 ECHO EXAM OF ABDOMEN: CPT

## 2025-03-20 PROCEDURE — 97161 PT EVAL LOW COMPLEX 20 MIN: CPT

## 2025-03-20 PROCEDURE — 85025 COMPLETE CBC W/AUTO DIFF WBC: CPT | Performed by: EMERGENCY MEDICINE

## 2025-03-20 PROCEDURE — 83605 ASSAY OF LACTIC ACID: CPT | Performed by: EMERGENCY MEDICINE

## 2025-03-20 PROCEDURE — 84702 CHORIONIC GONADOTROPIN TEST: CPT | Performed by: EMERGENCY MEDICINE

## 2025-03-20 PROCEDURE — 36415 COLL VENOUS BLD VENIPUNCTURE: CPT | Performed by: EMERGENCY MEDICINE

## 2025-03-20 PROCEDURE — 25810000003 SODIUM CHLORIDE 0.9 % SOLUTION: Performed by: FAMILY MEDICINE

## 2025-03-20 PROCEDURE — 82140 ASSAY OF AMMONIA: CPT | Performed by: EMERGENCY MEDICINE

## 2025-03-20 PROCEDURE — 25010000002 HEPARIN (PORCINE) PER 1000 UNITS: Performed by: FAMILY MEDICINE

## 2025-03-20 PROCEDURE — 99223 1ST HOSP IP/OBS HIGH 75: CPT | Performed by: FAMILY MEDICINE

## 2025-03-20 PROCEDURE — 74176 CT ABD & PELVIS W/O CONTRAST: CPT

## 2025-03-20 PROCEDURE — 87040 BLOOD CULTURE FOR BACTERIA: CPT | Performed by: INTERNAL MEDICINE

## 2025-03-20 PROCEDURE — 85007 BL SMEAR W/DIFF WBC COUNT: CPT | Performed by: EMERGENCY MEDICINE

## 2025-03-20 PROCEDURE — 80074 ACUTE HEPATITIS PANEL: CPT | Performed by: FAMILY MEDICINE

## 2025-03-20 PROCEDURE — 83690 ASSAY OF LIPASE: CPT | Performed by: EMERGENCY MEDICINE

## 2025-03-20 RX ORDER — BISACODYL 5 MG/1
5 TABLET, DELAYED RELEASE ORAL DAILY PRN
Status: DISCONTINUED | OUTPATIENT
Start: 2025-03-20 | End: 2025-03-27 | Stop reason: HOSPADM

## 2025-03-20 RX ORDER — OXCARBAZEPINE 150 MG/1
1 TABLET, FILM COATED ORAL EVERY 12 HOURS SCHEDULED
COMMUNITY
Start: 2025-02-20

## 2025-03-20 RX ORDER — SODIUM CHLORIDE 9 MG/ML
125 INJECTION, SOLUTION INTRAVENOUS CONTINUOUS
Status: DISCONTINUED | OUTPATIENT
Start: 2025-03-20 | End: 2025-03-20

## 2025-03-20 RX ORDER — IPRATROPIUM BROMIDE AND ALBUTEROL SULFATE 2.5; .5 MG/3ML; MG/3ML
3 SOLUTION RESPIRATORY (INHALATION)
Status: DISCONTINUED | OUTPATIENT
Start: 2025-03-20 | End: 2025-03-27 | Stop reason: HOSPADM

## 2025-03-20 RX ORDER — SODIUM CHLORIDE 9 MG/ML
40 INJECTION, SOLUTION INTRAVENOUS AS NEEDED
Status: DISCONTINUED | OUTPATIENT
Start: 2025-03-20 | End: 2025-03-27 | Stop reason: HOSPADM

## 2025-03-20 RX ORDER — AMOXICILLIN 250 MG
2 CAPSULE ORAL 2 TIMES DAILY PRN
Status: DISCONTINUED | OUTPATIENT
Start: 2025-03-20 | End: 2025-03-27 | Stop reason: HOSPADM

## 2025-03-20 RX ORDER — OXYCODONE AND ACETAMINOPHEN 10; 325 MG/1; MG/1
1 TABLET ORAL EVERY 6 HOURS PRN
COMMUNITY

## 2025-03-20 RX ORDER — ONDANSETRON 2 MG/ML
4 INJECTION INTRAMUSCULAR; INTRAVENOUS EVERY 6 HOURS PRN
Status: DISCONTINUED | OUTPATIENT
Start: 2025-03-20 | End: 2025-03-27 | Stop reason: HOSPADM

## 2025-03-20 RX ORDER — GABAPENTIN 600 MG/1
600 TABLET ORAL DAILY
COMMUNITY
Start: 2025-03-12

## 2025-03-20 RX ORDER — QUETIAPINE FUMARATE 100 MG/1
100 TABLET, FILM COATED ORAL NIGHTLY PRN
COMMUNITY
Start: 2025-03-12

## 2025-03-20 RX ORDER — BUPROPION HYDROCHLORIDE 150 MG/1
1 TABLET ORAL DAILY
COMMUNITY
Start: 2025-03-07

## 2025-03-20 RX ORDER — OXYBUTYNIN CHLORIDE 5 MG/1
5 TABLET ORAL 3 TIMES DAILY
Status: ON HOLD | COMMUNITY
Start: 2025-01-11 | End: 2025-03-20

## 2025-03-20 RX ORDER — ESCITALOPRAM OXALATE 10 MG/1
1 TABLET ORAL DAILY
COMMUNITY
Start: 2025-02-24

## 2025-03-20 RX ORDER — MIRTAZAPINE 15 MG/1
15 TABLET, FILM COATED ORAL NIGHTLY
Status: DISCONTINUED | OUTPATIENT
Start: 2025-03-20 | End: 2025-03-27 | Stop reason: HOSPADM

## 2025-03-20 RX ORDER — ESCITALOPRAM OXALATE 10 MG/1
10 TABLET ORAL DAILY
Status: DISCONTINUED | OUTPATIENT
Start: 2025-03-20 | End: 2025-03-27 | Stop reason: HOSPADM

## 2025-03-20 RX ORDER — PANTOPRAZOLE SODIUM 40 MG/1
40 TABLET, DELAYED RELEASE ORAL DAILY
COMMUNITY
Start: 2025-03-13

## 2025-03-20 RX ORDER — VANCOMYCIN 2 GRAM/500 ML IN 0.9 % SODIUM CHLORIDE INTRAVENOUS
20 ONCE
Status: COMPLETED | OUTPATIENT
Start: 2025-03-20 | End: 2025-03-20

## 2025-03-20 RX ORDER — HEPARIN SODIUM 5000 [USP'U]/ML
5000 INJECTION, SOLUTION INTRAVENOUS; SUBCUTANEOUS EVERY 12 HOURS SCHEDULED
Status: DISCONTINUED | OUTPATIENT
Start: 2025-03-20 | End: 2025-03-27 | Stop reason: HOSPADM

## 2025-03-20 RX ORDER — SODIUM CHLORIDE 0.9 % (FLUSH) 0.9 %
10 SYRINGE (ML) INJECTION AS NEEDED
Status: DISCONTINUED | OUTPATIENT
Start: 2025-03-20 | End: 2025-03-27 | Stop reason: HOSPADM

## 2025-03-20 RX ORDER — SODIUM CHLORIDE 0.9 % (FLUSH) 0.9 %
10 SYRINGE (ML) INJECTION EVERY 12 HOURS SCHEDULED
Status: DISCONTINUED | OUTPATIENT
Start: 2025-03-20 | End: 2025-03-27 | Stop reason: HOSPADM

## 2025-03-20 RX ORDER — CARIPRAZINE 3 MG/1
1 CAPSULE, GELATIN COATED ORAL DAILY
COMMUNITY
Start: 2025-03-14

## 2025-03-20 RX ORDER — POLYETHYLENE GLYCOL 3350 17 G/17G
17 POWDER, FOR SOLUTION ORAL DAILY PRN
Status: DISCONTINUED | OUTPATIENT
Start: 2025-03-20 | End: 2025-03-27 | Stop reason: HOSPADM

## 2025-03-20 RX ORDER — TOPIRAMATE 100 MG/1
100 TABLET, FILM COATED ORAL EVERY 12 HOURS SCHEDULED
Status: DISCONTINUED | OUTPATIENT
Start: 2025-03-20 | End: 2025-03-20

## 2025-03-20 RX ORDER — BUPROPION HYDROCHLORIDE 150 MG/1
150 TABLET ORAL DAILY
Status: DISCONTINUED | OUTPATIENT
Start: 2025-03-20 | End: 2025-03-27 | Stop reason: HOSPADM

## 2025-03-20 RX ORDER — OXYCODONE AND ACETAMINOPHEN 10; 325 MG/1; MG/1
1 TABLET ORAL EVERY 6 HOURS PRN
Refills: 0 | Status: DISPENSED | OUTPATIENT
Start: 2025-03-20 | End: 2025-03-25

## 2025-03-20 RX ORDER — TAMSULOSIN HYDROCHLORIDE 0.4 MG/1
1 CAPSULE ORAL DAILY
Status: ON HOLD | COMMUNITY
Start: 2025-01-11 | End: 2025-03-20

## 2025-03-20 RX ORDER — BISACODYL 10 MG
10 SUPPOSITORY, RECTAL RECTAL DAILY PRN
Status: DISCONTINUED | OUTPATIENT
Start: 2025-03-20 | End: 2025-03-27 | Stop reason: HOSPADM

## 2025-03-20 RX ORDER — ACETAMINOPHEN 325 MG/1
650 TABLET ORAL EVERY 6 HOURS PRN
Status: DISCONTINUED | OUTPATIENT
Start: 2025-03-20 | End: 2025-03-27 | Stop reason: HOSPADM

## 2025-03-20 RX ORDER — CYANOCOBALAMIN 1000 UG/ML
1000 INJECTION, SOLUTION INTRAMUSCULAR; SUBCUTANEOUS
Status: ON HOLD | COMMUNITY
Start: 2025-03-06 | End: 2025-03-20

## 2025-03-20 RX ORDER — TOPIRAMATE 100 MG/1
1 TABLET, FILM COATED ORAL EVERY 12 HOURS SCHEDULED
Status: ON HOLD | COMMUNITY
Start: 2025-02-17 | End: 2025-03-20

## 2025-03-20 RX ORDER — TAMSULOSIN HYDROCHLORIDE 0.4 MG/1
0.4 CAPSULE ORAL DAILY
Status: DISCONTINUED | OUTPATIENT
Start: 2025-03-20 | End: 2025-03-20

## 2025-03-20 RX ORDER — SACCHAROMYCES BOULARDII 250 MG
250 CAPSULE ORAL 2 TIMES DAILY
Status: DISCONTINUED | OUTPATIENT
Start: 2025-03-20 | End: 2025-03-27 | Stop reason: HOSPADM

## 2025-03-20 RX ORDER — OXYBUTYNIN CHLORIDE 5 MG/1
5 TABLET ORAL 3 TIMES DAILY
Status: DISCONTINUED | OUTPATIENT
Start: 2025-03-20 | End: 2025-03-20

## 2025-03-20 RX ORDER — OXCARBAZEPINE 150 MG/1
150 TABLET, FILM COATED ORAL EVERY 12 HOURS SCHEDULED
Status: DISCONTINUED | OUTPATIENT
Start: 2025-03-20 | End: 2025-03-27 | Stop reason: HOSPADM

## 2025-03-20 RX ORDER — PANTOPRAZOLE SODIUM 40 MG/1
40 TABLET, DELAYED RELEASE ORAL DAILY
Status: DISCONTINUED | OUTPATIENT
Start: 2025-03-20 | End: 2025-03-27 | Stop reason: HOSPADM

## 2025-03-20 RX ORDER — MIRTAZAPINE 15 MG/1
15 TABLET, FILM COATED ORAL NIGHTLY PRN
COMMUNITY
Start: 2025-03-12

## 2025-03-20 RX ORDER — IBUPROFEN 800 MG/1
800 TABLET, FILM COATED ORAL EVERY 8 HOURS PRN
COMMUNITY
Start: 2025-01-13

## 2025-03-20 RX ORDER — HYDROXYZINE HYDROCHLORIDE 25 MG/1
25 TABLET, FILM COATED ORAL 3 TIMES DAILY PRN
Status: DISCONTINUED | OUTPATIENT
Start: 2025-03-20 | End: 2025-03-27 | Stop reason: HOSPADM

## 2025-03-20 RX ORDER — HYDROXYZINE HYDROCHLORIDE 25 MG/1
1-2 TABLET, FILM COATED ORAL 3 TIMES DAILY PRN
COMMUNITY
Start: 2025-02-24

## 2025-03-20 RX ORDER — OXYCODONE AND ACETAMINOPHEN 5; 325 MG/1; MG/1
1 TABLET ORAL EVERY 8 HOURS PRN
Status: ON HOLD | COMMUNITY
Start: 2025-01-11 | End: 2025-03-20

## 2025-03-20 RX ORDER — TERIPARATIDE 250 UG/ML
20 INJECTION, SOLUTION SUBCUTANEOUS DAILY
COMMUNITY
Start: 2025-02-28

## 2025-03-20 RX ADMIN — HEPARIN SODIUM 5000 UNITS: 5000 INJECTION INTRAVENOUS; SUBCUTANEOUS at 10:27

## 2025-03-20 RX ADMIN — BUPROPION HYDROCHLORIDE 150 MG: 150 TABLET, EXTENDED RELEASE ORAL at 10:27

## 2025-03-20 RX ADMIN — SODIUM CHLORIDE 1000 ML: 0.9 INJECTION, SOLUTION INTRAVENOUS at 02:45

## 2025-03-20 RX ADMIN — MIRTAZAPINE 15 MG: 15 TABLET, FILM COATED ORAL at 21:40

## 2025-03-20 RX ADMIN — Medication 250 MG: at 10:27

## 2025-03-20 RX ADMIN — PANTOPRAZOLE SODIUM 40 MG: 40 TABLET, DELAYED RELEASE ORAL at 10:27

## 2025-03-20 RX ADMIN — IPRATROPIUM BROMIDE AND ALBUTEROL SULFATE 3 ML: .5; 3 SOLUTION RESPIRATORY (INHALATION) at 16:08

## 2025-03-20 RX ADMIN — CARIPRAZINE 3 MG: 1.5 CAPSULE, GELATIN COATED ORAL at 13:35

## 2025-03-20 RX ADMIN — VANCOMYCIN HYDROCHLORIDE 2000 MG: 5 INJECTION, POWDER, LYOPHILIZED, FOR SOLUTION INTRAVENOUS at 19:46

## 2025-03-20 RX ADMIN — CEFTRIAXONE SODIUM 1000 MG: 1 INJECTION, POWDER, FOR SOLUTION INTRAMUSCULAR; INTRAVENOUS at 02:46

## 2025-03-20 RX ADMIN — IPRATROPIUM BROMIDE AND ALBUTEROL SULFATE 3 ML: .5; 3 SOLUTION RESPIRATORY (INHALATION) at 11:11

## 2025-03-20 RX ADMIN — TOPIRAMATE 100 MG: 100 TABLET, FILM COATED ORAL at 11:25

## 2025-03-20 RX ADMIN — Medication 10 ML: at 13:35

## 2025-03-20 RX ADMIN — ESCITALOPRAM OXALATE 10 MG: 10 TABLET ORAL at 10:27

## 2025-03-20 RX ADMIN — SODIUM CHLORIDE 3.38 G: 9 INJECTION, SOLUTION INTRAVENOUS at 19:10

## 2025-03-20 RX ADMIN — ACETAMINOPHEN 650 MG: 325 TABLET ORAL at 18:32

## 2025-03-20 RX ADMIN — HEPARIN SODIUM 5000 UNITS: 5000 INJECTION INTRAVENOUS; SUBCUTANEOUS at 21:40

## 2025-03-20 RX ADMIN — SODIUM CHLORIDE 125 ML/HR: 9 INJECTION, SOLUTION INTRAVENOUS at 09:49

## 2025-03-20 RX ADMIN — Medication 250 MG: at 21:40

## 2025-03-20 RX ADMIN — OXCARBAZEPINE 150 MG: 150 TABLET, FILM COATED ORAL at 21:40

## 2025-03-20 NOTE — H&P
[facility]   HISTORY AND PHYSICAL    Patient Name: Vaishali Griffin  : 1970  MRN: [id number]  Primary Care Physician:  Elisabeth Potter APRN  Date of admission: 3/20/2025    Subjective   Subjective     Chief Complaint: Weakness and confusion    HPI:    Vaishali Griffin is a 55 y.o. female past medical history of COPD anxiety, depression, recurrent UTIs, obesity, and GERD presenting to the ED for evaluation of weakness, confusion and dysuria.  Patient stated for the last week or so she has not been feeling well with intermittent chills, fevers, dysuria, decreased appetite and worsening weakness.  Patient recently started to feel intermittently confused so she went to outside facility for further evaluation but was unsatisfied with care/SNF AMA and has been taking an unknown antibiotics for the last 2 days for UTI.  Since her symptoms continue to worsen she came to this facility for further evaluation.  In the ED patient's vitals were all within normal limits on arrival.  UA did show that she had a significant UTI with signs of dehydration, and labs showed anemia, reduced renal function, abnormal LFTs, uremia and hyperglycemia.  CT abdomen showed appropriately placed ureteral stents bilaterally but despite stenting she still has bilateral hydronephrosis seen on prior studies.  When seen patient was oriented to self and place and a very poor historian but when asked she did admit to fevers, chills, dysuria, headache, decreased appetite, body aches, and weakness.  She denied any   focal weakness, chest pain, palpitation, shortness of breath, cough,   vomiting, diarrhea, constipation, hematuria, hematochezia, melena, or anxiety.  Patient admitted for further evaluation and treatment.    Review of Systems   All systems were reviewed and negative except for: As per HPI    Personal History     Past Medical History:   Diagnosis Date    Anxiety     COPD (chronic obstructive pulmonary disease)     Depression        History  reviewed. No pertinent surgical history.    Family History: family history is not on file. Otherwise pertinent FHx was reviewed and not pertinent to current issue.    Social History:  reports that she has quit smoking. Her smoking use included cigarettes. She does not have any smokeless tobacco history on file. She reports that she does not drink alcohol and does not use drugs.    Home Medications:  Cariprazine HCl, QUEtiapine, buPROPion XL, escitalopram, gabapentin, mirtazapine, oxyCODONE-acetaminophen, oxybutynin, pantoprazole, tamsulosin, and topiramate      Allergies:  No Known Allergies    Objective   Objective     Vitals:   Temp:  [98.1 °F (36.7 °C)] 98.1 °F (36.7 °C)  Heart Rate:  [87-99] 97  Resp:  [18] 18  BP: (108-132)/(59-81) 109/81  Physical Exam    Constitutional: Awake, alert, ill-appearing, unkempt, obese   Eyes: PERRLA, sclerae anicteric, no conjunctival injection   HENT: NCAT, mucous membranes moist   Neck: Supple, no thyromegaly, no lymphadenopathy, trachea midline   Respiratory: Clear to auscultation bilaterally, nonlabored respirations    Cardiovascular: Tachycardia, no murmurs, rubs, or gallops, palpable pedal pulses bilaterally   Gastrointestinal: Positive bowel sounds, soft, nontender, nondistended   Musculoskeletal: No bilateral ankle edema, no clubbing or cyanosis to extremities   Psychiatric: Appropriate affect, cooperative   Neurologic: Oriented x 2, strength symmetric in all extremities, Cranial Nerves grossly intact to confrontation, speech clear   Skin: No rashes     Result Review    Result Review:  I have personally reviewed the results from the time of this admission to 3/20/2025 06:06 EDT and agree with these findings:  [x]  Laboratory list / accordion  []  Microbiology  [x]  Radiology  [x]  EKG/Telemetry   []  Cardiology/Vascular   []  Pathology  []  Old records  []  Other:  Most notable findings include: UA did show that she had a significant UTI with signs of dehydration, and labs  showed anemia, reduced renal function, abnormal LFTs, uremia and hyperglycemia.  CT abdomen showed appropriately placed ureteral stents bilaterally but despite stenting she still has bilateral hydronephrosis seen on prior studies.      Assessment & Plan   Assessment / Plan     Brief Patient Summary:  Vaishali Griffin is a 55 y.o. female past medical history of COPD anxiety, depression, recurrent UTIs, obesity, and GERD presenting to the ED for evaluation of weakness, confusion and dysuria.     Active Hospital Problems:  Active Hospital Problems    Diagnosis     **Generalized weakness     AMS (altered mental status)     Recurrent UTI      Plan:     AMS/generalized weakness  -Admit to telemetry  -Secondary to UTI, will treat  -Dehydration also noted  -IVF  -Fall precautions  -PT OT  -Supportive care    UTI  -Recurrent issue  -UA reviewed  -Empiric antibiotics  -Blood and urine cultures  -IVF  -Will follow labs  -Urology consulted warranted      COPD  Anxiety  Depression  Obesity    GI ppx  DVT ppx        CODE STATUS: Full code     Admission Status:  I believe this patient meets inpatient status.      Electronically signed by Marshal Tierney MD, 03/20/25, 6:06 AM EDT.

## 2025-03-20 NOTE — PLAN OF CARE
Goal Outcome Evaluation:  Plan of Care Reviewed With: patient        Progress: no change  Outcome Evaluation: Patient admitted today from ED for UTI: weakness, AMS, and frequent urination. Pt is now alert and oriented with intermittent confusion, no c/o pain, x1 to BSC, BPs run soft, received neb tx - seemed to help with breathing, moisture associated skin breakdown/redness under pt folds - breasts and abdominal areas - skin care complete, POC ongoing

## 2025-03-20 NOTE — THERAPY EVALUATION
Acute Care - Physical Therapy Initial Evaluation   [facility]     Patient Name: Vaishali Griffin  : 1970  MRN: [id number]  Today's Date: 3/20/2025      Visit Dx:     ICD-10-CM ICD-9-CM   1. Acute pyelonephritis  N10 590.10   2. Difficulty walking  R26.2 719.7     Patient Active Problem List   Diagnosis    AMS (altered mental status)    Generalized weakness    Recurrent UTI     Past Medical History:   Diagnosis Date    Anxiety     COPD (chronic obstructive pulmonary disease)     Depression      History reviewed. No pertinent surgical history.  PT Assessment (Last 12 Hours)       PT Evaluation and Treatment       Row Name 25 1000          Physical Therapy Time and Intention    Subjective Information no complaints  -DP     Document Type evaluation  -DP     Mode of Treatment individual therapy;physical therapy  -DP     Patient Effort good  -DP       Row Name 25 1000          General Information    Patient Profile Reviewed yes  -DP     Patient Observations alert;cooperative  -DP     Prior Level of Function independent:;gait;transfer;bed mobility;ADL's  -DP     Equipment Currently Used at Home walker, rolling  -DP     Existing Precautions/Restrictions fall  -DP     Barriers to Rehab none identified  -DP       Row Name 25 1000          Living Environment    Current Living Arrangements home  -DP     People in Home spouse;sibling(s)  -DP       Row Name 25 1000          Range of Motion (ROM)    Range of Motion bilateral lower extremities;ROM is WFL  -DP       Row Name 25 1000          Strength Comprehensive (MMT)    General Manual Muscle Testing (MMT) Assessment lower extremity strength deficits identified  -DP     Comment, General Manual Muscle Testing (MMT) Assessment BLE: 4-/5  -DP       Row Name 25 1000          Bed Mobility    Bed Mobility supine-sit-supine  -DP     Supine-Sit-Supine Vincent (Bed Mobility) minimum assist (75% patient effort)  -DP       Row Name 25 1000           Transfers    Transfers sit-stand transfer  -DP       Row Name 03/20/25 1000          Sit-Stand Transfer    Sit-Stand Elliott (Transfers) minimum assist (75% patient effort)  -DP       Row Name 03/20/25 1000          Gait/Stairs (Locomotion)    Gait/Stairs Locomotion gait/ambulation assistive device  -DP     Elliott Level (Gait) contact guard  -DP     Assistive Device (Gait) walker, front-wheeled  -DP     Patient was able to Ambulate yes  -DP     Distance in Feet (Gait) 12  -DP       Row Name 03/20/25 1000          Balance    Balance Assessment standing dynamic balance  -DP     Dynamic Standing Balance minimal assist  -DP     Position/Device Used, Standing Balance supported  -DP       Row Name 03/20/25 1000          Plan of Care Review    Plan of Care Reviewed With patient  -DP     Outcome Evaluation Pt presents with decreased strength, transfers and functional mobility. Will benefit from inpatient PT services and continued PT services upon discharge.  -DP       Row Name 03/20/25 1000          Therapy Assessment/Plan (PT)    Criteria for Skilled Interventions Met (PT) yes;meets criteria  -DP     Therapy Frequency (PT) daily  -DP     Predicted Duration of Therapy Intervention (PT) 10 days  -DP     Problem List (PT) problems related to;mobility  -DP     Activity Limitations Related to Problem List (PT) unable to transfer safely;unable to ambulate safely  -DP       Row Name 03/20/25 1000          PT Evaluation Complexity    History, PT Evaluation Complexity no personal factors and/or comorbidities  -DP     Examination of Body Systems (PT Eval Complexity) total of 4 or more elements  -DP     Clinical Presentation (PT Evaluation Complexity) stable  -DP     Clinical Decision Making (PT Evaluation Complexity) low complexity  -DP     Overall Complexity (PT Evaluation Complexity) low complexity  -DP       Row Name 03/20/25 1000          Physical Therapy Goals    Transfer Goal Selection (PT) transfer, PT goal 1   -DP     Gait Training Goal Selection (PT) gait training, PT goal 1  -DP       Row Name 03/20/25 1000          Transfer Goal 1 (PT)    Activity/Assistive Device (Transfer Goal 1, PT) sit-to-stand/stand-to-sit  -DP     Castile Level/Cues Needed (Transfer Goal 1, PT) supervision required  -DP     Time Frame (Transfer Goal 1, PT) 10 days  -DP       Row Name 03/20/25 1000          Gait Training Goal 1 (PT)    Activity/Assistive Device (Gait Training Goal 1, PT) assistive device use;walker, rolling  -DP     Castile Level (Gait Training Goal 1, PT) supervision required  -DP     Distance (Gait Training Goal 1, PT) 200  -DP     Time Frame (Gait Training Goal 1, PT) 10 days  -DP               User Key  (r) = Recorded By, (t) = Taken By, (c) = Cosigned By      Initials Name Provider Type    Alyssa Hua, PT Physical Therapist                      PT Recommendation and Plan  Anticipated Discharge Disposition (PT): sub acute care setting  Planned Therapy Interventions (PT): balance training, bed mobility training, gait training, strengthening, transfer training  Therapy Frequency (PT): daily  Plan of Care Reviewed With: patient  Outcome Evaluation: Pt presents with decreased strength, transfers and functional mobility. Will benefit from inpatient PT services and continued PT services upon discharge.   Outcome Measures       Row Name 03/20/25 1000             How much help from another person do you currently need...    Turning from your back to your side while in flat bed without using bedrails? 3  -DP      Moving from lying on back to sitting on the side of a flat bed without bedrails? 3  -DP      Moving to and from a bed to a chair (including a wheelchair)? 3  -DP      Standing up from a chair using your arms (e.g., wheelchair, bedside chair)? 3  -DP      Climbing 3-5 steps with a railing? 3  -DP      To walk in hospital room? 3  -DP      AM-PAC 6 Clicks Score (PT) 18  -DP         Functional Assessment     Outcome Measure Options AM-PAC 6 Clicks Basic Mobility (PT)  -DP                User Key  (r) = Recorded By, (t) = Taken By, (c) = Cosigned By      Initials Name Provider Type    Alyssa Hua, PT Physical Therapist                     Time Calculation:    PT Charges       Row Name 03/20/25 1025             Time Calculation    PT Received On 03/20/25  -DP      PT Goal Re-Cert Due Date 03/29/25  -DP         Untimed Charges    PT Eval/Re-eval Minutes 20  -DP         Total Minutes    Untimed Charges Total Minutes 20  -DP       Total Minutes 20  -DP                User Key  (r) = Recorded By, (t) = Taken By, (c) = Cosigned By      Initials Name Provider Type    Alyssa Hua, PT Physical Therapist                      PT G-Codes  Outcome Measure Options: AM-PAC 6 Clicks Basic Mobility (PT)  AM-PAC 6 Clicks Score (PT): 18    Alyssa Collins, PT  3/20/2025

## 2025-03-20 NOTE — PROGRESS NOTES
[facility] Clinical Pharmacy Services: Dose Adjustment     Ceftriaxone has been appropriately dose adjusted based on our System P&T approved policy. Pharmacy will continue to monitor patient renal function while in-house.     Ruth Herrmann [facility]  Clinical Pharmacist

## 2025-03-20 NOTE — ED PROVIDER NOTES
Time: 1:04 AM EDT  Date of encounter:  3/19/2025  Independent Historian/Clinical History and Information was obtained by:   Patient and Family    History is limited by: Altered Mental Status    Chief Complaint: Altered mental state      History of Present Illness:  Patient is a 55 y.o. year old female who presents to the emergency department for evaluation of altered mental state    Patient's daughter says that she is increasingly confused over the past several days.  States that she was seen at outside hospital emergency department ultimately they left as they felt they were not receiving an adequate care.  The patient's been treated with an unknown antibiotic for the past 2 days for possible urinary tract infection.  Patient has diffuse abdominal discomfort and pain with urination.  Denies any fevers or chills.  No nausea or vomiting.      Patient Care Team  Primary Care Provider: Elisabeth Potter APRN    Past Medical History:     No Known Allergies  Past Medical History:   Diagnosis Date    Anxiety     COPD (chronic obstructive pulmonary disease)     Depression      History reviewed. No pertinent surgical history.  History reviewed. No pertinent family history.    Home Medications:  Prior to Admission medications    Not on File        Social History:   Social History     Tobacco Use    Smoking status: Former     Types: Cigarettes   Substance Use Topics    Alcohol use: Never    Drug use: Never         Review of Systems:  Review of Systems   Constitutional:  Negative for chills and fever.   HENT:  Negative for congestion, ear pain and sore throat.    Eyes:  Negative for pain.   Respiratory:  Negative for cough, chest tightness and shortness of breath.    Cardiovascular:  Negative for chest pain.   Gastrointestinal:  Negative for abdominal pain, diarrhea, nausea and vomiting.   Genitourinary:  Positive for dysuria. Negative for flank pain and hematuria.   Musculoskeletal:  Negative for joint swelling.   Skin:   Negative for pallor.   Neurological:  Negative for seizures and headaches.   Psychiatric/Behavioral:  Positive for confusion.    All other systems reviewed and are negative.       Physical Exam:  /81 (BP Location: Right arm, Patient Position: Lying)   Pulse 97   Temp 98.1 °F (36.7 °C) (Oral)   Resp 18   SpO2 95%     Physical Exam  Vitals and nursing note reviewed.   Constitutional:       General: She is not in acute distress.     Appearance: Normal appearance. She is not toxic-appearing.   HENT:      Head: Normocephalic and atraumatic.      Jaw: There is normal jaw occlusion.   Eyes:      General: Lids are normal.      Extraocular Movements: Extraocular movements intact.      Conjunctiva/sclera: Conjunctivae normal.      Pupils: Pupils are equal, round, and reactive to light.   Cardiovascular:      Rate and Rhythm: Normal rate and regular rhythm.      Pulses: Normal pulses.      Heart sounds: Normal heart sounds.   Pulmonary:      Effort: Pulmonary effort is normal. No respiratory distress.      Breath sounds: Normal breath sounds. No wheezing or rhonchi.   Abdominal:      General: Abdomen is flat.      Palpations: Abdomen is soft.      Tenderness: There is no abdominal tenderness. There is no guarding or rebound.   Musculoskeletal:         General: Normal range of motion.      Cervical back: Normal range of motion and neck supple.      Right lower leg: No edema.      Left lower leg: No edema.   Skin:     General: Skin is warm and dry.   Neurological:      Mental Status: She is alert. Mental status is at baseline. She is confused.   Psychiatric:         Mood and Affect: Mood normal.              Medical Decision Making:      Comorbidities that affect care:    Recurrent urinary tract infection    External Notes reviewed:    Previous Clinic Note: Outpatient urology visit Dr. Waters November 2024      The following orders were placed and all results were independently analyzed by me:  Orders Placed This  Encounter   Procedures    Blood Culture - Blood,    Blood Culture - Blood,    COVID-19, FLU A/B, RSV PCR 1 HR TAT - Swab, Nasopharynx    Urine Culture - Urine,    CT Abdomen Pelvis Without Contrast    US Liver    Krypton Draw    Comprehensive Metabolic Panel    Lipase    Lactic Acid, Plasma    hCG, Quantitative, Pregnancy    CBC Auto Differential    Urinalysis With Culture If Indicated - Urine, Clean Catch    Ammonia    Scan Slide    Urinalysis, Microscopic Only - Urine, Clean Catch    Hemoglobin A1c    Hepatitis Panel, Acute    Diet: Regular/House; Fluid Consistency: Thin (IDDSI 0)    Undress & Gown    Hospitalist (on-call MD unless specified)    Insert Peripheral IV    Inpatient Admission    CBC & Differential    Green Top (Gel)    Lavender Top    Gold Top - SST    Light Blue Top       Medications Given in the Emergency Department:  Medications   sodium chloride 0.9 % flush 10 mL (has no administration in time range)   cefTRIAXone (ROCEPHIN) in NS 1 gram/10ml IV PUSH syringe (1,000 mg Intravenous Given 3/20/25 0246)   sodium chloride 0.9 % bolus 1,000 mL (0 mL Intravenous Stopped 3/20/25 0541)        ED Course:         Labs:    Lab Results (last 24 hours)       Procedure Component Value Units Date/Time    CBC & Differential [[id number]]  (Abnormal) Collected: 03/20/25 0111    Specimen: Blood from Arm, Right Updated: 03/20/25 0137    Narrative:      The following orders were created for panel order CBC & Differential.  Procedure                               Abnormality         Status                     ---------                               -----------         ------                     CBC Auto Differential[[id number]]        Abnormal            Final result               Scan Slide[[id number]]                                       Final result                 Please view results for these tests on the individual orders.    Comprehensive Metabolic Panel [[id number]]  (Abnormal) Collected: 03/20/25 0111    Specimen:  Blood from Arm, Right Updated: 03/20/25 0138     Glucose 142 mg/dL      BUN 49 mg/dL      Creatinine 1.93 mg/dL      Sodium 131 mmol/L      Potassium 3.6 mmol/L      Chloride 96 mmol/L      CO2 20.5 mmol/L      Calcium 9.5 mg/dL      Total Protein 8.0 g/dL      Albumin 3.3 g/dL      ALT (SGPT) 123 U/L      AST (SGOT) 201 U/L      Alkaline Phosphatase 189 U/L      Total Bilirubin 0.4 mg/dL      Globulin 4.7 gm/dL      A/G Ratio 0.7 g/dL      BUN/Creatinine Ratio 25.4     Anion Gap 14.5 mmol/L      eGFR 30.7 mL/min/1.73     Narrative:      GFR Categories in Chronic Kidney Disease (CKD)      GFR Category          GFR (mL/min/1.73)    Interpretation  G1                     90 or greater         Normal or high (1)  G2                      60-89                Mild decrease (1)  G3a                   45-59                Mild to moderate decrease  G3b                   30-44                Moderate to severe decrease  G4                    15-29                Severe decrease  G5                    14 or less           Kidney failure          (1)In the absence of evidence of kidney disease, neither GFR category G1 or G2 fulfill the criteria for CKD.    eGFR calculation 2021 CKD-EPI creatinine equation, which does not include race as a factor    Lipase [[id number]]  (Normal) Collected: 03/20/25 0111    Specimen: Blood from Arm, Right Updated: 03/20/25 0138     Lipase 30 U/L     Lactic Acid, Plasma [[id number]]  (Normal) Collected: 03/20/25 0111    Specimen: Blood from Arm, Right Updated: 03/20/25 0134     Lactate 1.6 mmol/L     hCG, Quantitative, Pregnancy [[id number]] Collected: 03/20/25 0111    Specimen: Blood from Arm, Right Updated: 03/20/25 0136     HCG Quantitative 3.22 mIU/mL     Narrative:      HCG Ranges by Gestational Age    Females - non-pregnant premenopausal   </= 1mIU/mL HCG  Females - postmenopausal               </= 7mIU/mL HCG    3 Weeks       5.4   -      72 mIU/mL  4 Weeks      10.2   -     708 mIU/mL  5  Weeks       217   -   8,245 mIU/mL  6 Weeks       152   -  32,177 mIU/mL  7 Weeks     4,059   - 153,767 mIU/mL  8 Weeks    31,366   - 149,094 mIU/mL  9 Weeks    59,109   - 135,901 mIU/mL  10 Weeks   44,186   - 170,409 mIU/mL  12 Weeks   27,107   - 201,615 mIU/mL  14 Weeks   24,302   -  93,646 mIU/mL  15 Weeks   12,540   -  69,747 mIU/mL  16 Weeks    8,904   -  55,332 mIU/mL  17 Weeks    8,240   -  51,793 mIU/mL  18 Weeks    9,649   -  55,271 mIU/mL      CBC Auto Differential [[id number]]  (Abnormal) Collected: 03/20/25 0111    Specimen: Blood from Arm, Right Updated: 03/20/25 0137     WBC 10.36 10*3/mm3      RBC 3.90 10*6/mm3      Hemoglobin 9.4 g/dL      Hematocrit 31.1 %      MCV 79.7 fL      MCH 24.1 pg      MCHC 30.2 g/dL      RDW 15.8 %      RDW-SD 45.3 fl      MPV 12.7 fL      Platelets 234 10*3/mm3      Neutrophil % 81.9 %      Lymphocyte % 5.4 %      Monocyte % 11.0 %      Eosinophil % 0.0 %      Basophil % 0.4 %      Immature Grans % 1.3 %      Neutrophils, Absolute 8.49 10*3/mm3      Lymphocytes, Absolute 0.56 10*3/mm3      Monocytes, Absolute 1.14 10*3/mm3      Eosinophils, Absolute 0.00 10*3/mm3      Basophils, Absolute 0.04 10*3/mm3      Immature Grans, Absolute 0.13 10*3/mm3      nRBC 0.0 /100 WBC     Urinalysis With Culture If Indicated - Urine, Clean Catch [[id number]]  (Abnormal) Collected: 03/20/25 0111    Specimen: Urine, Clean Catch Updated: 03/20/25 0132     Color, UA Dark Yellow     Appearance, UA Turbid     pH, UA 6.0     Specific Gravity, UA 1.014     Glucose, UA Negative     Ketones, UA Trace     Bilirubin, UA Negative     Blood, UA Large (3+)     Protein,  mg/dL (2+)     Leuk Esterase, UA Large (3+)     Nitrite, UA Negative     Urobilinogen, UA 1.0 E.U./dL    Narrative:      In absence of clinical symptoms, the presence of pyuria, bacteria, and/or nitrites on the urinalysis result does not correlate with infection.    Scan Slide [[id number]] Collected: 03/20/25 0111    Specimen: Blood  from Arm, Right Updated: 03/20/25 0137     RBC Morphology Normal     WBC Morphology Normal     Platelet Estimate Adequate    Urinalysis, Microscopic Only - Urine, Clean Catch [[id number]]  (Abnormal) Collected: 03/20/25 0111    Specimen: Urine, Clean Catch Updated: 03/20/25 0152     RBC, UA 11-20 /HPF      WBC, UA Too Numerous to Count /HPF      Bacteria, UA 4+ /HPF      Squamous Epithelial Cells, UA 7-12 /HPF      Hyaline Casts, UA 0-2 /LPF      Methodology Manual Light Microscopy    Urine Culture - Urine, Urine, Clean Catch [[id number]] Collected: 03/20/25 0111    Specimen: Urine, Clean Catch Updated: 03/20/25 0152    Blood Culture - Blood, Arm, Right [[id number]] Collected: 03/20/25 0125    Specimen: Blood from Arm, Right Updated: 03/20/25 0129    Blood Culture - Blood, Arm, Left [[id number]] Collected: 03/20/25 0125    Specimen: Blood from Arm, Left Updated: 03/20/25 0129    Ammonia [[id number]]  (Normal) Collected: 03/20/25 0125    Specimen: Blood from Arm, Left Updated: 03/20/25 0149     Ammonia 17 umol/L     COVID-19, FLU A/B, RSV PCR 1 HR TAT - Swab, Nasopharynx [[id number]]  (Normal) Collected: 03/20/25 0143    Specimen: Swab from Nasopharynx Updated: 03/20/25 0233     COVID19 Not Detected     Influenza A PCR Not Detected     Influenza B PCR Not Detected     RSV, PCR Not Detected    Narrative:      Fact sheet for providers: https://www.fda.gov/media/759633/download    Fact sheet for patients: https://www.fda.gov/media/670839/download    Test performed by PCR.             Imaging:    CT Abdomen Pelvis Without Contrast  Result Date: 3/20/2025  CT ABDOMEN PELVIS WO CONTRAST Date of Exam: 3/20/2025 2:47 AM EDT Indication: Flank pain, kidney stone suspected History of ureteral stents, UTI Comparison: 4/9/2024 Technique: Axial CT images were obtained of the abdomen and pelvis without the administration of contrast. Reconstructed coronal and sagittal images were also obtained. Automated exposure control and iterative  construction methods were used. Findings: Lung bases are clear. Aorta is normal in caliber. Gallbladder unremarkable. No biliary obstruction. Small fat-containing umbilical hernia is unchanged. There is fatty atrophy of the pancreas. Bilateral ureteral stents are again identified. These have likely been replaced since the prior study and are more proximally located with the left in the renal pelvis and the right near the UPJ. Despite the stents, there is persistent moderate bilateral hydronephrosis and hydroureter that is similar to the previous study. There is stranding around both kidneys now noted in addition to stranding along the courses of both ureters. Ascending urinary tract infection is not excluded. No kidney stones are identified. Unenhanced solid abdominal organs are otherwise normal. Urinary bladder is normal. The distal ends of both stents are present within the bladder lumen. Uterus is surgically absent. The appendix is normal. There is colonic diverticulosis without diverticulitis or colitis. No small bowel obstruction is seen. Stomach is normal. No adenopathy or free fluid is identified. Patient is status post L3-4 spinal fusion. This is new from the prior study. L3-4 spondylolisthesis is again seen.     1.Bilateral ureteral stents are again identified. These have likely been replaced since the prior study and are more proximally located with the left in the renal pelvis and on the right near the UPJ. Despite the stents, there is persistent moderate bilateral hydronephrosis and hydroureter that is similar to the previous study. There is stranding around both kidneys in addition to stranding along the courses of both ureters. Ascending urinary tract infection is not excluded. 2.Colonic diverticulosis without diverticulitis. Normal appendix. No bowel obstruction. 3.Additional chronic findings as described above. Electronically Signed: Joce Joyner MD  3/20/2025 3:25 AM EDT  Workstation ID:  [id number]        Differential Diagnosis and Discussion:    Altered Mental Status: Based on the patient's signs and symptoms, differential diagnosis includes but is not limited to meningitis, stroke, sepsis, subarachnoid hemorrhage, intracranial bleeding, encephalitis, and metabolic encephalopathy.    PROCEDURES:    Labs were collected in the emergency department and all labs were reviewed and interpreted by me.  CT scan was performed in the emergency department and the CT scan radiology impression was interpreted by me.    No orders to display       Procedures    MDM                 Sepsis criteria was met in the emergency department and the Sepsis protocol (including antibiotic administration) was initiated.      SIRS criteria considered:   1.  Temperature > 100.4 or <96.8    2.  Heart Rate > 90    3.  Respiratory Rate > 22    4.  WBC > 12K or <4K.             Severe Sepsis:     Respiratory: Mechanical Ventilation or Bipap  Hypotension: SBP > 90 or MAP < 65  Renal: Creatinine > 2  Metabolic: Lactic Acid > 2  Hematologic: Platelets < 100K or INR > 1.5  Hepatic: BILI  >  2  CNS: Sudden AMS     Septic Shock:     Severe Sepsis + Persistent hypotension or Lactic Acid > 4     Normal saline bolus, Antibiotics, and final disposition was based on these definitions.        Sepsis was recognized at 0215    Antibiotics were ordered.     30 mL/kg bolus was NOT indicated.       Patient did not receive the recommended 30 mL/kg fluid bolus for sepsis because it would be harmful or detrimental to the patient.    The patient has Concern for Fluid Overload.   The patient was ordered 1L of fluids.  Patient was reassessed after fluid bolus.    The patient presents with 2 out of the 4 SIRS criteria and a suspected source for sepsis.  Patient was evaluated and placed on a cardiac monitor for fear of worsening tachycardia and life-threatening hypotension.  Patient was monitored for shock and signs of end-organ damage.  Mental status was  repeatedly checked throughout the ED stay.  Medications were ordered by me which includes ceftriaxone.  The case was discussed at length with admitting physician.     Total Critical Care time of 35 minutes. Total critical care time documented does not include time spent on separately billed procedures for services of nurses or physician assistants. I personally saw and examined the patient. I have reviewed all diagnostic interpretations and treatment plans as written. I was present for the key portions of any procedures performed and the inclusive time noted in any critical care statement. Critical care time includes patient management by me, time spent at the patients bedside,  time to review lab and imaging results, discussing patient care, documentation in the medical record, and time spent with family or caregiver.    Patient Care Considerations:          Consultants/Shared Management Plan:    Hospitalist: I have discussed the case with the hospitalist who agrees to accept the patient for admission.  Consultant: I have discussed the case with urology who agrees to consult on the patient.    Social Determinants of Health:    Patient has presented with family members who are responsible, reliable and will ensure follow up care.      Disposition and Care Coordination:    Admit:   Through independent evaluation of the patient's history, physical, and imperical data, the patient meets criteria for inpatient admission to the hospital.        Final diagnoses:   Acute pyelonephritis        ED Disposition       ED Disposition   Decision to Admit    Condition   --    Comment   Level of Care: Remote Telemetry [26]   Diagnosis: AMS (altered mental status) [[id number]]   Admitting Physician: LIZ LYNNE [[id number]]   Certification: I Certify That Inpatient Hospital Services Are Medically Necessary For Greater Than 2 Midnights                 This medical record created using voice recognition software.             Henry  MD Leonard  03/20/25 0610

## 2025-03-21 LAB
ALBUMIN SERPL-MCNC: 2.8 G/DL (ref 3.5–5.2)
ALBUMIN/GLOB SERPL: 0.7 G/DL
ALP SERPL-CCNC: 172 U/L (ref 39–117)
ALT SERPL W P-5'-P-CCNC: 94 U/L (ref 1–33)
ANION GAP SERPL CALCULATED.3IONS-SCNC: 13.5 MMOL/L (ref 5–15)
AST SERPL-CCNC: 113 U/L (ref 1–32)
BACTERIA SPEC AEROBE CULT: NORMAL
BILIRUB SERPL-MCNC: 0.5 MG/DL (ref 0–1.2)
BUN SERPL-MCNC: 59 MG/DL (ref 6–20)
BUN/CREAT SERPL: 25.9 (ref 7–25)
CALCIUM SPEC-SCNC: 8.9 MG/DL (ref 8.6–10.5)
CHLORIDE SERPL-SCNC: 102 MMOL/L (ref 98–107)
CO2 SERPL-SCNC: 18.5 MMOL/L (ref 22–29)
CREAT SERPL-MCNC: 2.28 MG/DL (ref 0.57–1)
DEPRECATED RDW RBC AUTO: 45.8 FL (ref 37–54)
EGFRCR SERPLBLD CKD-EPI 2021: 25.1 ML/MIN/1.73
ERYTHROCYTE [DISTWIDTH] IN BLOOD BY AUTOMATED COUNT: 15.9 % (ref 12.3–15.4)
GLOBULIN UR ELPH-MCNC: 4.2 GM/DL
GLUCOSE SERPL-MCNC: 123 MG/DL (ref 65–99)
HCT VFR BLD AUTO: 25.5 % (ref 34–46.6)
HGB BLD-MCNC: 7.9 G/DL (ref 12–15.9)
MCH RBC QN AUTO: 24.4 PG (ref 26.6–33)
MCHC RBC AUTO-ENTMCNC: 31 G/DL (ref 31.5–35.7)
MCV RBC AUTO: 78.7 FL (ref 79–97)
PLATELET # BLD AUTO: 236 10*3/MM3 (ref 140–450)
PMV BLD AUTO: 12.9 FL (ref 6–12)
POTASSIUM SERPL-SCNC: 3.2 MMOL/L (ref 3.5–5.2)
PROT SERPL-MCNC: 7 G/DL (ref 6–8.5)
RBC # BLD AUTO: 3.24 10*6/MM3 (ref 3.77–5.28)
SODIUM SERPL-SCNC: 134 MMOL/L (ref 136–145)
VANCOMYCIN SERPL-MCNC: 29 MCG/ML (ref 5–40)
WBC NRBC COR # BLD AUTO: 10.16 10*3/MM3 (ref 3.4–10.8)

## 2025-03-21 PROCEDURE — 94760 N-INVAS EAR/PLS OXIMETRY 1: CPT

## 2025-03-21 PROCEDURE — 99222 1ST HOSP IP/OBS MODERATE 55: CPT | Performed by: UROLOGY

## 2025-03-21 PROCEDURE — 80053 COMPREHEN METABOLIC PANEL: CPT | Performed by: FAMILY MEDICINE

## 2025-03-21 PROCEDURE — 94664 DEMO&/EVAL PT USE INHALER: CPT

## 2025-03-21 PROCEDURE — 85027 COMPLETE CBC AUTOMATED: CPT | Performed by: FAMILY MEDICINE

## 2025-03-21 PROCEDURE — 99233 SBSQ HOSP IP/OBS HIGH 50: CPT | Performed by: INTERNAL MEDICINE

## 2025-03-21 PROCEDURE — 94799 UNLISTED PULMONARY SVC/PX: CPT

## 2025-03-21 PROCEDURE — 25010000002 HEPARIN (PORCINE) PER 1000 UNITS: Performed by: FAMILY MEDICINE

## 2025-03-21 PROCEDURE — 25010000002 PIPERACILLIN SOD-TAZOBACTAM PER 1 G: Performed by: INTERNAL MEDICINE

## 2025-03-21 PROCEDURE — 80202 ASSAY OF VANCOMYCIN: CPT | Performed by: INTERNAL MEDICINE

## 2025-03-21 RX ORDER — NYSTATIN 100000 [USP'U]/G
POWDER TOPICAL EVERY 12 HOURS SCHEDULED
Status: DISCONTINUED | OUTPATIENT
Start: 2025-03-21 | End: 2025-03-21

## 2025-03-21 RX ORDER — NYSTATIN 100000 [USP'U]/G
POWDER TOPICAL
Status: DISCONTINUED | OUTPATIENT
Start: 2025-03-22 | End: 2025-03-27 | Stop reason: HOSPADM

## 2025-03-21 RX ORDER — POTASSIUM CHLORIDE 750 MG/1
40 CAPSULE, EXTENDED RELEASE ORAL ONCE
Status: COMPLETED | OUTPATIENT
Start: 2025-03-21 | End: 2025-03-21

## 2025-03-21 RX ADMIN — Medication 250 MG: at 20:26

## 2025-03-21 RX ADMIN — SODIUM CHLORIDE 3.38 G: 9 INJECTION, SOLUTION INTRAVENOUS at 08:30

## 2025-03-21 RX ADMIN — BUPROPION HYDROCHLORIDE 150 MG: 150 TABLET, EXTENDED RELEASE ORAL at 08:17

## 2025-03-21 RX ADMIN — MIRTAZAPINE 15 MG: 15 TABLET, FILM COATED ORAL at 20:26

## 2025-03-21 RX ADMIN — IPRATROPIUM BROMIDE AND ALBUTEROL SULFATE 3 ML: .5; 3 SOLUTION RESPIRATORY (INHALATION) at 12:43

## 2025-03-21 RX ADMIN — OXCARBAZEPINE 150 MG: 150 TABLET, FILM COATED ORAL at 20:26

## 2025-03-21 RX ADMIN — ACETAMINOPHEN 650 MG: 325 TABLET ORAL at 02:45

## 2025-03-21 RX ADMIN — OXCARBAZEPINE 150 MG: 150 TABLET, FILM COATED ORAL at 08:09

## 2025-03-21 RX ADMIN — NYSTATIN: 100000 POWDER TOPICAL at 08:20

## 2025-03-21 RX ADMIN — Medication 10 ML: at 08:10

## 2025-03-21 RX ADMIN — IPRATROPIUM BROMIDE AND ALBUTEROL SULFATE 3 ML: .5; 3 SOLUTION RESPIRATORY (INHALATION) at 23:13

## 2025-03-21 RX ADMIN — PANTOPRAZOLE SODIUM 40 MG: 40 TABLET, DELAYED RELEASE ORAL at 08:09

## 2025-03-21 RX ADMIN — SODIUM CHLORIDE 3.38 G: 9 INJECTION, SOLUTION INTRAVENOUS at 02:03

## 2025-03-21 RX ADMIN — HEPARIN SODIUM 5000 UNITS: 5000 INJECTION INTRAVENOUS; SUBCUTANEOUS at 08:09

## 2025-03-21 RX ADMIN — Medication 250 MG: at 08:09

## 2025-03-21 RX ADMIN — IPRATROPIUM BROMIDE AND ALBUTEROL SULFATE 3 ML: .5; 3 SOLUTION RESPIRATORY (INHALATION) at 06:59

## 2025-03-21 RX ADMIN — POTASSIUM CHLORIDE 40 MEQ: 750 CAPSULE, EXTENDED RELEASE ORAL at 10:42

## 2025-03-21 RX ADMIN — IPRATROPIUM BROMIDE AND ALBUTEROL SULFATE 3 ML: .5; 3 SOLUTION RESPIRATORY (INHALATION) at 18:40

## 2025-03-21 RX ADMIN — HEPARIN SODIUM 5000 UNITS: 5000 INJECTION INTRAVENOUS; SUBCUTANEOUS at 20:26

## 2025-03-21 RX ADMIN — SODIUM CHLORIDE 3.38 G: 9 INJECTION, SOLUTION INTRAVENOUS at 16:58

## 2025-03-21 RX ADMIN — CARIPRAZINE 3 MG: 1.5 CAPSULE, GELATIN COATED ORAL at 08:09

## 2025-03-21 RX ADMIN — ESCITALOPRAM OXALATE 10 MG: 10 TABLET ORAL at 08:09

## 2025-03-21 RX ADMIN — NYSTATIN: 100000 POWDER TOPICAL at 20:26

## 2025-03-21 NOTE — PROGRESS NOTES
"[facility] Clinical Pharmacy Services: Vancomycin Pharmacokinetic Initial Consult Note  Vaishali Case is a 55 y.o. female who is on day 2 of pharmacy to dose vancomycin for Bacteremia - blood cultures with no growth, concern for recurrent UTI in patient with bilateral ureteral stents.    Consult Information  Consulting Provider: Diana  Planned Duration of Therapy: 7 days  Was Patient Receiving Prior to Admission/Consult?: No  Loading Dose Ordered or Given: 2000 mg on 3/20 at 1946  PK/PD Target: Dose by Levels  Relevant ID History: -  Other Antimicrobials: Vancomycin and Piperacillin/Tazobactam    Imaging Reviewed?: Yes    Microbiology Data  Culture/Source:   3/20 Bx - NGTD  3/20 Ux - >100,000 CFU/mL Normal Urogenital Chen     Vitals/Labs  Ht: 170.2 cm (67\"); Wt: 102 kg (225 lb 1.4 oz)  Temp (24hrs), Av.2 °F (37.3 °C), Min:98.1 °F (36.7 °C), Max:102 °F (38.9 °C)   Estimated Creatinine Clearance: 35 mL/min (A) (by C-G formula based on SCr of 2.28 mg/dL (H)).     Results from last 7 days   Lab Units 25  0523 25  0111   VANCOMYCIN RM mcg/mL 29.00  --    CREATININE mg/dL 2.28* 1.93*   WBC 10*3/mm3 10.16 10.36     Assessment/Plan:  Vancomycin Dose: vancomycin level resulted this AM 29 mcg/mL following loading dose   Repeat Vanc Random ordered for 3/21 at AM labs  Patient has order for Complete Metabolic Panel    Pharmacy will follow patient's kidney function and will adjust doses and obtain levels as necessary. Thank you for involving pharmacy in this patient's care. Please contact pharmacy with any questions or concerns.         Mame Blandon, [facility]  Clinical Pharmacist    "

## 2025-03-21 NOTE — CONSULTS
[facility]   UROLOGY Consult Note    Patient Name: Vaishali Case  : 1970  MRN: [id number]  Primary Care Physician:  Elisabeth Potter APRN  Referring Physician: No Known Provider  Date of admission: 3/20/2025    Subjective   Subjective     Chief Complaint:     UTI /stents      HPI:  Vaishali Griffin is a 55 y.o. 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  No straining        3/19/2025 CT abdomen/pelvis without - bilateral ureteral stents noted.  Persistent moderate hydronephrosis.  Similar to prior study.  3/21/2025 2.2, GFR 25      3/20/2025 admitted through ED with fever/chills decreased appetite worsening weakness.  Intermittent confusion.    Currently on Zosyn/Vanco     3/20/2025 UA-11-20 RBCs, 4+ bacteria 7- 12 squamous       Stent was exchanged by [facility] in  was scheduled for stent exchange again in early April           Symptoms are usually better a few weeks after she gets her stent exchange before start having problems again           COPD, asthma  No anticoagulation  Former smoker       No pelvic radiation.         Previous       Oxybutynin 5 mg XL daily-did not help     has been seen by Dr De Jesus and  Urology [facility]    D mannose was too expensive    2024 cystoscopy with bilateral stent exchange  7 x 26  Lithostent(triangular)        2024 bilateral ureteral stent exchange 7 x 26 Lithostent -moderate calcification on the stents.     Daily MiraLAX - helping with her constipation          Bactrim DS p.o. BID x 2 weeks - did not help      guidance culture - Ureaplasma less than 10,000.       CT uro-- bilateral ureteral stents.  Both stents with proximal portion in proximal ureter.  Bilateral moderate hydronephrosis.  Small area of decreased enhancement lower pole left kidney, exclude pyelonephritis      4/24 1.0, GFR 67        PVR     3/24   106     UTIs for the last 9 months.           Strains to void at times     Symptoms - burning/dysuria/back pain/gross hematuria/frequency/urgency     1/4/2024 Dr. Rosenberg exchange stents     8/23 0.9, GFR 74       Has been having stent exchanges every 4 months     2/22 CT abdomen/pelvis with - no signs of metastatic disease.  Bilateral right greater than left hydronephrosis.  Down to the urinary bladder.  No stones.     4/21 CT abdomen/pelvis without - negative for metastatic disease.  New moderate right hydronephrosis with moderate left hydronephrosis.  No stone or mass.     2019 bilateral ureteral stents -status post hysterectomy with hydro after.     No history of nephrolithiasis  No  family history        Urine culture     9/12/2023 less than 10,000 colony-forming's per mL mixed urogenital kodak  8/22/2023 > 100,000 E. coli resistant to sulfonamide, tetracycline, otherwise sensitive     Op note 1/4/2024: Dr. 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      Review of Systems     10 systems reviewed and are negative other than what is listed in the HPI    Personal History     Past Medical History:   Diagnosis Date    Anxiety     COPD (chronic obstructive pulmonary disease)     Depression        History reviewed. No pertinent surgical history.    Family History: family history is not on file. Otherwise pertinent FHx was reviewed and not pertinent to current issue.    Social History:  reports that she has quit smoking. Her smoking use included cigarettes. She does not have any smokeless tobacco history on file. She reports that she does not drink alcohol and does not use drugs.    Home Medications:  Cariprazine HCl,  OXcarbazepine, QUEtiapine, Teriparatide, buPROPion XL, escitalopram, gabapentin, hydrOXYzine, ibuprofen, mirtazapine, oxyCODONE-acetaminophen, and pantoprazole    Allergies:  No Known Allergies    Objective    Objective     Vitals:   Temp:  [98.1 °F (36.7 °C)-102 °F (38.9 °C)] 99.9 °F (37.7 °C)  Heart Rate:  [] 78  Resp:  [16-20] 20  BP: (103-132)/(48-89) 107/73    Physical Exam:   Constitutional: Awake, alert    Respiratory: Clear to auscultation bilaterally, nonlabored respirations    Cardiovascular: RRR, no murmurs, rubs, or gallops, palpable pedal pulses bilaterally   Gastrointestinal: Positive bowel sounds, soft, nontender, nondistended   Musculoskeletal: No bilateral ankle edema, no clubbing or cyanosis to extremities   Psychiatric: Appropriate affect, cooperative   Neurologic: Oriented x 3, strength symmetric in all extremities, Cranial Nerves grossly intact to confrontation, speech clear   Skin: No rashes     Result Review    Result Review:  I have personally reviewed the results from the time of this admission to 3/21/2025 09:15 EDT and agree with these findings:  []  Laboratory  []  Microbiology  []  Radiology  []  EKG/Telemetry   []  Cardiology/Vascular   []  Pathology  []  Old records  []  Other:      Assessment & Plan   Assessment / Plan     Brief Patient Summary:  Vaishali Griffin is a 55 y.o. 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      We did discuss possibility of placing urethral Marmolejo to help with drainage after discussion of risk and benefits does not want Marmolejo at this time      After few days of antibiotics will consider stent exchange may do this middle next  week.      Will Follow                  Electronically signed by Giancarlo Waters MD, 03/21/25, 9:15 AM EDT.

## 2025-03-21 NOTE — CONSULTS
"Nutrition Services    Patient Name: Vaishali Case  YOB: 1970  MRN: [id number]  Admission date: 3/20/2025      CLINICAL NUTRITION ASSESSMENT      Reason for Assessment   Nurse Screening Consult     H&P:  Past Medical History:   Diagnosis Date    Anxiety     COPD (chronic obstructive pulmonary disease)     Depression         Current Problems:   Active Hospital Problems    Diagnosis     **Generalized weakness     AMS (altered mental status)     Recurrent UTI         Nutrition/Diet History         Narrative   Visited patient for nutrition consult rt report of decreased appetite and low intake. Patient reports unable to eat anything since she has gotten sick. Pt reports usual body weight  lb. No N/V/D reported. Patient received breakfast tray but did not feel like opening it and stated that she did not think she would be able to eat it. Dietetic intern discussed role of care while pt is admitted is to maintain pt's weight. Pt open to trying Boost Strawberry with her trays.      Anthropometrics        Current Height, Weight Height: 170.2 cm (67\")  Weight: 102 kg (225 lb 1.4 oz)   Current BMI Body mass index is 35.25 kg/m².   BMI Classification Obese Class II   % %   Adjusted Body Weight (ABW)    Weight Hx  Wt Readings from Last 30 Encounters:   03/20/25 0720 102 kg (225 lb 1.4 oz)          Wt Change Observation No wt loss reported per pt report.      Estimated/Assessed Needs  Estimated Needs based on: Ideal Body Weight       Energy Requirements 25-30 kcal/kg   EST Needs (kcal/day) 0795-9495 kcal       Protein Requirements 0.8-1.0 g/kg   EST Daily Needs (g/day) 49-62 g       Fluid Requirements 1 ml/kcal    Estimated Needs (mL/day) 3882-1649 ml     Labs/Medications         Pertinent Labs Reviewed.   Results from last 7 days   Lab Units 03/21/25  0523 03/20/25  0111   SODIUM mmol/L 134* 131*   POTASSIUM mmol/L 3.2* 3.6   CHLORIDE mmol/L 102 96*   CO2 mmol/L 18.5* 20.5*   BUN mg/dL 59* 49* "   CREATININE mg/dL 2.28* 1.93*   CALCIUM mg/dL 8.9 9.5   BILIRUBIN mg/dL 0.5 0.4   ALK PHOS U/L 172* 189*   ALT (SGPT) U/L 94* 123*   AST (SGOT) U/L 113* 201*   GLUCOSE mg/dL 123* 142*     Results from last 7 days   Lab Units 03/21/25  0523   HEMOGLOBIN g/dL 7.9*   HEMATOCRIT % 25.5*     COVID19   Date Value Ref Range Status   03/20/2025 Not Detected Not Detected - Ref. Range Final     Lab Results   Component Value Date    HGBA1C 5.80 (H) 03/20/2025         Pertinent Medications Reviewed.     Malnutrition Severity Assessment              Nutrition Diagnosis         Nutrition Dx Problem 1 Inadequate energy Intake related to Inability to consume sufficient energy as evidenced by decreased appetite., patient report., and report of minimal PO intake.     Nutrition Intervention           Current Nutrition Orders & Evaluation of Intake       Current PO Diet Diet: Regular/House; Fluid Consistency: Thin (IDDSI 0)   Supplement No active supplement orders           Nutrition Intervention/Prescription        Boost Strawberry BID. Provides 480 kcal, 20 g pro        Medical Nutrition Therapy/Nutrition Education          Learner     Readiness N/A  N/A     Method     Response N/A  N/A     Monitor/Evaluation        Monitor Per protocol, PO intake, Supplement intake, Weight     Nutrition Discharge Plan         No nutrition discharge needs identified at this time     Electronically signed by:  Tiffany Herrmann  03/21/25 09:07 EDT

## 2025-03-21 NOTE — PROGRESS NOTES
[facility]   Hospitalist Progress Note  Date: 3/21/2025  Patient Name: Vaishali Griffin  : 1970  MRN: [id number]  Date of admission: 3/20/2025  Consultants:   -Urology: Dr. Giancarlo Waters    Subjective   Subjective     Chief Complaint: Weakness and confusion    Summary:   Vaishali Case is a 55 y.o. female with COPD, anxiety/depression, recurrent UTIs, obesity and GERD that presented to ED due to weakness, confusion and dysuria.  Urinalysis consistent with UTI.  Creatinine elevated compared to baseline.  CT abdomen showed appropriately placed ureteral stents bilaterally but despite stenting patient still had bilateral hydronephrosis.  Empiric antibiotics started.  Urology consulted.    Interval Followup:   No acute events overnight.  Creatinine uptrending.  Urology contacted this morning.  Potassium low.  Patient states she feels about the same as she did when she got to the hospital.    Antibiotics:   -Zosyn    Pain Medication:   -Percocet    Objective   Objective     Vitals:   Temp:  [98.1 °F (36.7 °C)-102 °F (38.9 °C)] 98.1 °F (36.7 °C)  Heart Rate:  [] 89  Resp:  [20] 20  BP: (103-132)/(48-89) 122/58  Physical Exam   Gen: Lying in bed, conversant, pleasant  Resp: CTAB, No w/r/r, No respiratory distress appreciated  Card: RRR, No m/r/g  Abd: Soft, Nontender, Nondistended, + bowel sounds    Result Review    Result Review:  I have personally reviewed the results as below and agree with these findings:  []  Laboratory:   CMP          3/20/2025    01:11 3/21/2025    05:23   CMP   Glucose 142  123    BUN 49  59    Creatinine 1.93  2.28    EGFR 30.7  25.1    Sodium 131  134    Potassium 3.6  3.2    Chloride 96  102    Calcium 9.5  8.9    Total Protein 8.0  7.0    Albumin 3.3  2.8    Globulin 4.7  4.2    Total Bilirubin 0.4  0.5    Alkaline Phosphatase 189  172    AST (SGOT) 201  113    ALT (SGPT) 123  94    Albumin/Globulin Ratio 0.7  0.7    BUN/Creatinine Ratio 25.4  25.9    Anion Gap 14.5  13.5      CBC           3/20/2025    01:11 3/21/2025    05:23   CBC   WBC 10.36  10.16    RBC 3.90  3.24    Hemoglobin 9.4  7.9    Hematocrit 31.1  25.5    MCV 79.7  78.7    MCH 24.1  24.4    MCHC 30.2  31.0    RDW 15.8  15.9    Platelets 234  236    Hemoglobin A1c: 5.8  [x]  Microbiology: Blood culture (03/20/2025): No growth to date.  Urine culture (03/20/2025): Normal urogenital kodak.  []  Radiology:   [x]  EKG/Telemetry:    []  Cardiology/Vascular:    []  Pathology:  []  Old records:  []  Other:    Assessment & Plan   Assessment / Plan     Assessment:  Recurrent UTI due to unknown infectious organism  Bilateral ureteral obstruction with chronic indwelling ureteral stent  Hypokalemia  History of stage IA2 grade 1 squamous cell carcinoma the cervix  Obesity (BMI: 35)  Anxiety/depression  COPD, not acute exacerbate  GERD    Plan:  -Urology consulted and following, appreciate assistance and recommendations in the care of this patient.  -Continue Zosyn, tailor based on results of infectious workup  -Replace potassium  -Management discussed with urology.  Will consider stent exchange after a few days of antibiotics  -Per discussion with urology patient does not want Marmolejo catheter at this time  -Start appropriate home medications  -PT/OT consulted  -Will monitor electrolytes and renal function with BMP and magnesium level in the AM  -Will monitor WBC and Hgb with CBC in the AM  -Clinical course will dictate further management     DVT Prophylaxis: Heparin  GI Prophylaxis: Pantoprazole  Diet:   Diet Order   Procedures    Diet: Regular/House; Fluid Consistency: Thin (IDDSI 0)     Dispo: PT/OT consult     Time spent personally reviewing patient's chart, labs and imaging, evaluating/examining the patient, discussing care plan with patient and nurse at bedside and discussing management with consultants (urology): 51 minutes.     Part of this note may be an electronic transcription/translation of spoken language to printed text using the  Dragon dictation system.    VTE Prophylaxis:  Pharmacologic VTE prophylaxis orders are present.        CODE STATUS:   Code Status (Patient has no pulse and is not breathing): CPR (Attempt to Resuscitate)  Medical Interventions (Patient has pulse or is breathing): Full Support  Level Of Support Discussed With: Patient        Electronically signed by Keagan Galindo MD, 3/21/2025, 16:47 EDT.

## 2025-03-21 NOTE — PLAN OF CARE
Goal Outcome Evaluation:  Plan of Care Reviewed With: patient        Progress: no change  Outcome Evaluation: Patient A&Ox4 with intermittent confusion, VSS, nystatin for MASD in folds, up to bedside commode with standby assist, no c/oo pain this shift, no fever this shift, discussed with  possible d/c plan options, POC ongoing

## 2025-03-21 NOTE — PLAN OF CARE
Goal Outcome Evaluation:           Progress: no change  Outcome Evaluation: Patient A&O with intermittent confusion, VSS, nystatin ordered for MASD in folds, up to bedside commode with standby assist, IVAB maintained, medicated for pain x1 this shift.Heidi Bhat RN

## 2025-03-22 LAB
ANION GAP SERPL CALCULATED.3IONS-SCNC: 11.9 MMOL/L (ref 5–15)
BUN SERPL-MCNC: 41 MG/DL (ref 6–20)
BUN/CREAT SERPL: 26.8 (ref 7–25)
CALCIUM SPEC-SCNC: 8.6 MG/DL (ref 8.6–10.5)
CHLORIDE SERPL-SCNC: 107 MMOL/L (ref 98–107)
CO2 SERPL-SCNC: 19.1 MMOL/L (ref 22–29)
CREAT SERPL-MCNC: 1.53 MG/DL (ref 0.57–1)
DEPRECATED RDW RBC AUTO: 43 FL (ref 37–54)
EGFRCR SERPLBLD CKD-EPI 2021: 40.5 ML/MIN/1.73
ERYTHROCYTE [DISTWIDTH] IN BLOOD BY AUTOMATED COUNT: 15.7 % (ref 12.3–15.4)
GLUCOSE SERPL-MCNC: 129 MG/DL (ref 65–99)
HCT VFR BLD AUTO: 23.3 % (ref 34–46.6)
HGB BLD-MCNC: 7.6 G/DL (ref 12–15.9)
MAGNESIUM SERPL-MCNC: 2.2 MG/DL (ref 1.6–2.6)
MCH RBC QN AUTO: 25.1 PG (ref 26.6–33)
MCHC RBC AUTO-ENTMCNC: 32.6 G/DL (ref 31.5–35.7)
MCV RBC AUTO: 76.9 FL (ref 79–97)
PHOSPHATE SERPL-MCNC: 2.3 MG/DL (ref 2.5–4.5)
PLATELET # BLD AUTO: 304 10*3/MM3 (ref 140–450)
PMV BLD AUTO: 12.9 FL (ref 6–12)
POTASSIUM SERPL-SCNC: 3.2 MMOL/L (ref 3.5–5.2)
RBC # BLD AUTO: 3.03 10*6/MM3 (ref 3.77–5.28)
SODIUM SERPL-SCNC: 138 MMOL/L (ref 136–145)
VANCOMYCIN SERPL-MCNC: 10.67 MCG/ML (ref 5–40)
WBC NRBC COR # BLD AUTO: 9.97 10*3/MM3 (ref 3.4–10.8)

## 2025-03-22 PROCEDURE — 83735 ASSAY OF MAGNESIUM: CPT | Performed by: INTERNAL MEDICINE

## 2025-03-22 PROCEDURE — 25010000002 VANCOMYCIN 5 G RECONSTITUTED SOLUTION: Performed by: INTERNAL MEDICINE

## 2025-03-22 PROCEDURE — 25810000003 SODIUM CHLORIDE 0.9 % SOLUTION: Performed by: INTERNAL MEDICINE

## 2025-03-22 PROCEDURE — 99231 SBSQ HOSP IP/OBS SF/LOW 25: CPT | Performed by: UROLOGY

## 2025-03-22 PROCEDURE — 84100 ASSAY OF PHOSPHORUS: CPT | Performed by: INTERNAL MEDICINE

## 2025-03-22 PROCEDURE — 94799 UNLISTED PULMONARY SVC/PX: CPT

## 2025-03-22 PROCEDURE — 80202 ASSAY OF VANCOMYCIN: CPT | Performed by: INTERNAL MEDICINE

## 2025-03-22 PROCEDURE — 25010000002 HEPARIN (PORCINE) PER 1000 UNITS: Performed by: FAMILY MEDICINE

## 2025-03-22 PROCEDURE — 99232 SBSQ HOSP IP/OBS MODERATE 35: CPT | Performed by: INTERNAL MEDICINE

## 2025-03-22 PROCEDURE — 94664 DEMO&/EVAL PT USE INHALER: CPT

## 2025-03-22 PROCEDURE — 25010000002 PIPERACILLIN SOD-TAZOBACTAM PER 1 G: Performed by: INTERNAL MEDICINE

## 2025-03-22 PROCEDURE — 94760 N-INVAS EAR/PLS OXIMETRY 1: CPT

## 2025-03-22 PROCEDURE — 80048 BASIC METABOLIC PNL TOTAL CA: CPT | Performed by: INTERNAL MEDICINE

## 2025-03-22 PROCEDURE — 85027 COMPLETE CBC AUTOMATED: CPT | Performed by: INTERNAL MEDICINE

## 2025-03-22 RX ADMIN — SODIUM CHLORIDE 3.38 G: 9 INJECTION, SOLUTION INTRAVENOUS at 10:51

## 2025-03-22 RX ADMIN — CARIPRAZINE 3 MG: 1.5 CAPSULE, GELATIN COATED ORAL at 09:08

## 2025-03-22 RX ADMIN — OXYCODONE HYDROCHLORIDE AND ACETAMINOPHEN 1 TABLET: 10; 325 TABLET ORAL at 22:39

## 2025-03-22 RX ADMIN — BUPROPION HYDROCHLORIDE 150 MG: 150 TABLET, EXTENDED RELEASE ORAL at 09:09

## 2025-03-22 RX ADMIN — OXCARBAZEPINE 150 MG: 150 TABLET, FILM COATED ORAL at 11:22

## 2025-03-22 RX ADMIN — IPRATROPIUM BROMIDE AND ALBUTEROL SULFATE 3 ML: .5; 3 SOLUTION RESPIRATORY (INHALATION) at 12:59

## 2025-03-22 RX ADMIN — Medication 250 MG: at 21:06

## 2025-03-22 RX ADMIN — IPRATROPIUM BROMIDE AND ALBUTEROL SULFATE 3 ML: .5; 3 SOLUTION RESPIRATORY (INHALATION) at 18:18

## 2025-03-22 RX ADMIN — MIRTAZAPINE 15 MG: 15 TABLET, FILM COATED ORAL at 21:06

## 2025-03-22 RX ADMIN — ESCITALOPRAM OXALATE 10 MG: 10 TABLET ORAL at 09:08

## 2025-03-22 RX ADMIN — HEPARIN SODIUM 5000 UNITS: 5000 INJECTION INTRAVENOUS; SUBCUTANEOUS at 21:06

## 2025-03-22 RX ADMIN — PANTOPRAZOLE SODIUM 40 MG: 40 TABLET, DELAYED RELEASE ORAL at 09:08

## 2025-03-22 RX ADMIN — Medication 10 ML: at 09:09

## 2025-03-22 RX ADMIN — NYSTATIN: 100000 POWDER TOPICAL at 21:07

## 2025-03-22 RX ADMIN — OXCARBAZEPINE 150 MG: 150 TABLET, FILM COATED ORAL at 21:06

## 2025-03-22 RX ADMIN — Medication 10 ML: at 21:07

## 2025-03-22 RX ADMIN — NYSTATIN: 100000 POWDER TOPICAL at 09:09

## 2025-03-22 RX ADMIN — Medication 250 MG: at 09:08

## 2025-03-22 RX ADMIN — IPRATROPIUM BROMIDE AND ALBUTEROL SULFATE 3 ML: .5; 3 SOLUTION RESPIRATORY (INHALATION) at 06:30

## 2025-03-22 RX ADMIN — SODIUM CHLORIDE 3.38 G: 9 INJECTION, SOLUTION INTRAVENOUS at 18:21

## 2025-03-22 RX ADMIN — VANCOMYCIN HYDROCHLORIDE 1250 MG: 5 INJECTION, POWDER, LYOPHILIZED, FOR SOLUTION INTRAVENOUS at 10:52

## 2025-03-22 RX ADMIN — SODIUM CHLORIDE 3.38 G: 9 INJECTION, SOLUTION INTRAVENOUS at 02:07

## 2025-03-22 RX ADMIN — HEPARIN SODIUM 5000 UNITS: 5000 INJECTION INTRAVENOUS; SUBCUTANEOUS at 09:08

## 2025-03-22 NOTE — PROGRESS NOTES
[facility]   Hospitalist Progress Note  Date: 3/22/2025  Patient Name: Vaishali Griffin  : 1970  MRN: [id number]  Date of admission: 3/20/2025  Consultants:   -Urology: Dr. Giancarlo Waters    Subjective   Subjective     Chief Complaint: Weakness and confusion    Summary:   Vaishali Case is a 55 y.o. female with COPD, anxiety/depression, recurrent UTIs, obesity and GERD that presented to ED due to weakness, confusion and dysuria.  Urinalysis consistent with UTI.  Creatinine elevated compared to baseline.  CT abdomen showed appropriately placed ureteral stents bilaterally but despite stenting patient still had bilateral hydronephrosis.  Empiric antibiotics started.  Urology consulted.    Interval Followup:   No acute events overnight.  Creatinine stable   Urology following. May do a stent exchange later in the week  Vitals stable  Patient denies any new symptoms    Antibiotics:   -Zosyn    Pain Medication:   -Percocet    Objective   Objective     Vitals:   Temp:  [97.5 °F (36.4 °C)-100.6 °F (38.1 °C)] 98.6 °F (37 °C)  Heart Rate:  [71-97] 71  Resp:  [16-20] 18  BP: (100-122)/(50-73) 106/73  Physical Exam   Gen: Lying in bed, conversant, pleasant  Resp: CTAB, No w/r/r, No respiratory distress appreciated  Card: RRR, No m/r/g  Abd: Soft, Nontender, Nondistended, + bowel sounds    Result Review    Result Review:  I have personally reviewed the results as below and agree with these findings:  []  Laboratory:   CMP          3/20/2025    01:11 3/21/2025    05:23 3/22/2025    05:21   CMP   Glucose 142  123  129    BUN 49  59  41    Creatinine 1.93  2.28  1.53    EGFR 30.7  25.1  40.5    Sodium 131  134  138    Potassium 3.6  3.2  3.2    Chloride 96  102  107    Calcium 9.5  8.9  8.6    Total Protein 8.0  7.0     Albumin 3.3  2.8     Globulin 4.7  4.2     Total Bilirubin 0.4  0.5     Alkaline Phosphatase 189  172     AST (SGOT) 201  113     ALT (SGPT) 123  94     Albumin/Globulin Ratio 0.7  0.7     BUN/Creatinine Ratio 25.4   25.9  26.8    Anion Gap 14.5  13.5  11.9      CBC          3/20/2025    01:11 3/21/2025    05:23 3/22/2025    05:21   CBC   WBC 10.36  10.16  9.97    RBC 3.90  3.24  3.03    Hemoglobin 9.4  7.9  7.6    Hematocrit 31.1  25.5  23.3    MCV 79.7  78.7  76.9    MCH 24.1  24.4  25.1    MCHC 30.2  31.0  32.6    RDW 15.8  15.9  15.7    Platelets 234  236  304    Hemoglobin A1c: 5.8  [x]  Microbiology: Blood culture (03/20/2025): No growth to date.  Urine culture (03/20/2025): Normal urogenital kodak.  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  Considering possible stent exchange middle of next week  -Continue Zosyn, cultures in process but so far showing no growth  -Replace potassium  -Home medications have been resumed  -PT/OT consulted  -Will monitor electrolytes and renal function with BMP and magnesium level in the AM  -Will monitor WBC and Hgb with CBC in the AM  -Clinical course will dictate further management     DVT Prophylaxis: Heparin  GI Prophylaxis: Pantoprazole  Diet:   Diet Order   Procedures    Diet: Regular/House; Fluid Consistency: Thin (IDDSI 0)     Dispo: PT/OT consult         VTE Prophylaxis:  Pharmacologic VTE prophylaxis orders are present.        CODE STATUS:   Code Status (Patient has no pulse and is not breathing): CPR (Attempt to Resuscitate)  Medical Interventions (Patient has pulse or is breathing): Full Support  Level Of Support Discussed With: Patient

## 2025-03-22 NOTE — PROGRESS NOTES
[facility]   Urology Progress Note    Patient Name: Vaishali Griffin  : 1970  MRN: [id number]  Primary Care Physician:  Elisabeth Potter APRN  Date of admission: 3/20/2025    Subjective   Subjective       Chills better  Ambulated yesterday    Objective   Objective     Vitals:   Temp:  [97.5 °F (36.4 °C)-100.6 °F (38.1 °C)] 97.5 °F (36.4 °C)  Heart Rate:  [73-97] 85  Resp:  [16-20] 18  BP: (100-122)/(50-73) 103/52  Physical Exam     Alert and oriented x3  No acute distress  Unlabored respirations  Nontender/nondistended       Result Review    Result Review:  I have personally reviewed the results from the time of this admission to 3/22/2025 07:08 EDT and agree with these findings:  []  Laboratory  []  Microbiology  []  Radiology  []  EKG/Telemetry   []  Cardiology/Vascular   []  Pathology  []  Old records  []  Other:      Assessment & Plan   Assessment / Plan     Brief Patient Summary:  Vaishali Griffin is a 55 y.o. 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       Patient dealing with chronic constipation we discussed drinking more fluids now at home and starting on MiraLAX daily       After few days of antibiotics will consider stent exchange may do this middle next week.  Could also do this outpatient while she is still on antibiotics        Will Follow

## 2025-03-22 NOTE — PROGRESS NOTES
"[facility] Clinical Pharmacy Services: Vancomycin Monitoring Note    Vaishali Griffin is a 55 y.o. female who is on day 3/7 of pharmacy to dose vancomycin for Bacteremia.      Imaging Reviewed?: Yes  Updated Cultures and Sensitivities:   3/20 blood cx NGTD  3/20 blood cx NGTD  3/20 urine cx normal kodak    Vitals/Labs  Ht: 170.2 cm (67\"); Wt: 102 kg (225 lb 1.4 oz)   Temp (24hrs), Av.5 °F (36.9 °C), Min:97.5 °F (36.4 °C), Max:100.6 °F (38.1 °C)   Estimated Creatinine Clearance: 52.2 mL/min (A) (by C-G formula based on SCr of 1.53 mg/dL (H)).       Results from last 7 days   Lab Units 25  0521 25  0523 25  0111   VANCOMYCIN RM mcg/mL 10.67 29.00  --    CREATININE mg/dL 1.53* 2.28* 1.93*   WBC 10*3/mm3 9.97 10.16 10.36     Assessment/Plan    Current Vancomycin Dose:  1250 mg IV every 24 hours; which provides the following predicted parameters:  AUC24,ss: 547 mg/L.hr  Probability of AUC24 > 400: 100 %  Ctrough,ss: 13.8 mg/L  Probability of Ctrough,ss > 20: 2 %  Probability of nephrotoxicity (Lodise SATISH ): 9 %    We will continue to monitor patient changes and renal function     Thank you for involving pharmacy in this patient's care. Please contact pharmacy with any questions or concerns.    Karo Ch  Clinical Pharmacist    "

## 2025-03-22 NOTE — PLAN OF CARE
Goal Outcome Evaluation:           Progress: no change  Outcome Evaluation: Patient alert and oriented. Reports not aleeping well and feeling tired.Vital signs stable. Patient able to get up to bedside commode with one person assistance. No c/o pain or new onset of symptoms at this time. Continue plan of care.

## 2025-03-22 NOTE — SIGNIFICANT NOTE
Wound Eval / Progress Noted    MARY Chen     Patient Name: Vaishali Griffin  : 1970  MRN: [id number]  Today's Date: 3/21/2025                 Admit Date: 3/20/2025    Visit Dx:    ICD-10-CM ICD-9-CM   1. Acute pyelonephritis  N10 590.10   2. Difficulty walking  R26.2 719.7         Generalized weakness    AMS (altered mental status)    Recurrent UTI        Past Medical History:   Diagnosis Date    Anxiety     COPD (chronic obstructive pulmonary disease)     Depression         History reviewed. No pertinent surgical history.      Physical Assessment:     25 1130   Skin   Skin WDL X;color;integrity   Skin Color/Characteristics redness blanchable;other (see comments)  (blanchable redness to gluteal aspects and heels)   Skin Integrity other (see comments)  (redness with a fungal presentation noted to abdominal crease, groin creases, and breast creases.)     Left abdominal crease     Medial abdominal crease     Right abdominal crease    Wound Check / Follow-up:  Patient seen today for wound consult.  Patient is awake, alert, and oriented.  Patient reports she lives at home and is able to care for self.  Patient with a scar noted to her medial back, which she reports is from a back surgery in 2024.    Redness with a fungal presentation noted to abdominal crease, bilateral groin creases, and bilateral breast creases.  No areas of erosion noted.  Cleansed with normal saline gauze, blotted dry.  Recommending quality skin care and hygiene with application of nystatin powder twice a day.  May apply dry sheets for added moisture absorption.    Blanchable pink/redness to gluteal aspects and heels. Recommending quality skin care and hygiene with application of blue top moisture barrier four times a day, and as needed for incontinence.  Implement every 2 hour turns, and offload at all times.  Keep patient clean, dry, and free from all moisture.    Wound care signing off, please reconsult if any further skin  issues or wound needs arise.    Impression: Redness with a fungal presentation to abdominal crease, bilateral groin creases, and bilateral breast creases.    Short term goals:  Regain skin integrity, skin protection, moisture prevention, pressure reduction, quality skin care and hygiene, topical treatment.    Sabrina Lopez RN    3/21/2025    21:30 EDT

## 2025-03-22 NOTE — PLAN OF CARE
Goal Outcome Evaluation:  Plan of Care Reviewed With: patient        Progress: no change  Outcome Evaluation: Patient is A&Ox4. VSS. Patient febrile once this shift. IV ABX given per orders. Skin care provided per orders. Plan of care ongoing.

## 2025-03-23 LAB
ANION GAP SERPL CALCULATED.3IONS-SCNC: 13.4 MMOL/L (ref 5–15)
BUN SERPL-MCNC: 29 MG/DL (ref 6–20)
BUN/CREAT SERPL: 21.5 (ref 7–25)
CALCIUM SPEC-SCNC: 8.5 MG/DL (ref 8.6–10.5)
CHLORIDE SERPL-SCNC: 110 MMOL/L (ref 98–107)
CO2 SERPL-SCNC: 18.6 MMOL/L (ref 22–29)
CREAT SERPL-MCNC: 1.35 MG/DL (ref 0.57–1)
EGFRCR SERPLBLD CKD-EPI 2021: 47.1 ML/MIN/1.73
GLUCOSE SERPL-MCNC: 130 MG/DL (ref 65–99)
POTASSIUM SERPL-SCNC: 3.2 MMOL/L (ref 3.5–5.2)
SODIUM SERPL-SCNC: 142 MMOL/L (ref 136–145)
WHOLE BLOOD HOLD SPECIMEN: NORMAL

## 2025-03-23 PROCEDURE — 25010000002 VANCOMYCIN 5 G RECONSTITUTED SOLUTION: Performed by: INTERNAL MEDICINE

## 2025-03-23 PROCEDURE — 80048 BASIC METABOLIC PNL TOTAL CA: CPT | Performed by: INTERNAL MEDICINE

## 2025-03-23 PROCEDURE — 94664 DEMO&/EVAL PT USE INHALER: CPT

## 2025-03-23 PROCEDURE — 25010000002 HEPARIN (PORCINE) PER 1000 UNITS: Performed by: FAMILY MEDICINE

## 2025-03-23 PROCEDURE — 25010000002 PIPERACILLIN SOD-TAZOBACTAM PER 1 G: Performed by: INTERNAL MEDICINE

## 2025-03-23 PROCEDURE — 99232 SBSQ HOSP IP/OBS MODERATE 35: CPT | Performed by: INTERNAL MEDICINE

## 2025-03-23 PROCEDURE — 94799 UNLISTED PULMONARY SVC/PX: CPT

## 2025-03-23 PROCEDURE — 25810000003 SODIUM CHLORIDE 0.9 % SOLUTION: Performed by: INTERNAL MEDICINE

## 2025-03-23 RX ORDER — POTASSIUM CHLORIDE 750 MG/1
40 CAPSULE, EXTENDED RELEASE ORAL ONCE
Status: COMPLETED | OUTPATIENT
Start: 2025-03-23 | End: 2025-03-23

## 2025-03-23 RX ORDER — TERIPARATIDE 250 UG/ML
20 INJECTION, SOLUTION SUBCUTANEOUS DAILY
Status: DISCONTINUED | OUTPATIENT
Start: 2025-03-23 | End: 2025-03-27 | Stop reason: HOSPADM

## 2025-03-23 RX ADMIN — OXCARBAZEPINE 150 MG: 150 TABLET, FILM COATED ORAL at 09:11

## 2025-03-23 RX ADMIN — SODIUM CHLORIDE 3.38 G: 9 INJECTION, SOLUTION INTRAVENOUS at 01:50

## 2025-03-23 RX ADMIN — OXCARBAZEPINE 150 MG: 150 TABLET, FILM COATED ORAL at 21:04

## 2025-03-23 RX ADMIN — SODIUM CHLORIDE 3.38 G: 9 INJECTION, SOLUTION INTRAVENOUS at 18:03

## 2025-03-23 RX ADMIN — IPRATROPIUM BROMIDE AND ALBUTEROL SULFATE 3 ML: .5; 3 SOLUTION RESPIRATORY (INHALATION) at 18:36

## 2025-03-23 RX ADMIN — Medication 10 ML: at 21:04

## 2025-03-23 RX ADMIN — Medication 10 ML: at 09:11

## 2025-03-23 RX ADMIN — NYSTATIN: 100000 POWDER TOPICAL at 21:04

## 2025-03-23 RX ADMIN — CARIPRAZINE 3 MG: 1.5 CAPSULE, GELATIN COATED ORAL at 09:10

## 2025-03-23 RX ADMIN — VANCOMYCIN HYDROCHLORIDE 1250 MG: 5 INJECTION, POWDER, LYOPHILIZED, FOR SOLUTION INTRAVENOUS at 09:25

## 2025-03-23 RX ADMIN — HEPARIN SODIUM 5000 UNITS: 5000 INJECTION INTRAVENOUS; SUBCUTANEOUS at 09:10

## 2025-03-23 RX ADMIN — TERIPARATIDE 20 MCG: 250 INJECTION, SOLUTION SUBCUTANEOUS at 15:48

## 2025-03-23 RX ADMIN — IPRATROPIUM BROMIDE AND ALBUTEROL SULFATE 3 ML: .5; 3 SOLUTION RESPIRATORY (INHALATION) at 00:45

## 2025-03-23 RX ADMIN — Medication 250 MG: at 09:31

## 2025-03-23 RX ADMIN — SODIUM CHLORIDE 3.38 G: 9 INJECTION, SOLUTION INTRAVENOUS at 09:31

## 2025-03-23 RX ADMIN — IPRATROPIUM BROMIDE AND ALBUTEROL SULFATE 3 ML: .5; 3 SOLUTION RESPIRATORY (INHALATION) at 06:35

## 2025-03-23 RX ADMIN — BUPROPION HYDROCHLORIDE 150 MG: 150 TABLET, EXTENDED RELEASE ORAL at 09:11

## 2025-03-23 RX ADMIN — ESCITALOPRAM OXALATE 10 MG: 10 TABLET ORAL at 09:11

## 2025-03-23 RX ADMIN — MIRTAZAPINE 15 MG: 15 TABLET, FILM COATED ORAL at 21:03

## 2025-03-23 RX ADMIN — PANTOPRAZOLE SODIUM 40 MG: 40 TABLET, DELAYED RELEASE ORAL at 09:11

## 2025-03-23 RX ADMIN — POTASSIUM CHLORIDE 40 MEQ: 750 CAPSULE, EXTENDED RELEASE ORAL at 10:12

## 2025-03-23 RX ADMIN — HEPARIN SODIUM 5000 UNITS: 5000 INJECTION INTRAVENOUS; SUBCUTANEOUS at 21:04

## 2025-03-23 RX ADMIN — NYSTATIN: 100000 POWDER TOPICAL at 09:31

## 2025-03-23 RX ADMIN — IPRATROPIUM BROMIDE AND ALBUTEROL SULFATE 3 ML: .5; 3 SOLUTION RESPIRATORY (INHALATION) at 12:00

## 2025-03-23 RX ADMIN — Medication 250 MG: at 21:03

## 2025-03-23 RX ADMIN — OXYCODONE HYDROCHLORIDE AND ACETAMINOPHEN 1 TABLET: 10; 325 TABLET ORAL at 10:12

## 2025-03-23 NOTE — PLAN OF CARE
Goal Outcome Evaluation:  Plan of Care Reviewed With: patient        Progress: improving  Outcome Evaluation: AAO X4. TOLERATING DIET, ROOM AIR, & ACTIVTY WITH ASSIST, CM: NSR                              I spoke with Aysha and she said that the patient's sister will try to convince her to do it this weekend or next week. Aysha said she will call us back and let us know how it goes.

## 2025-03-23 NOTE — PROGRESS NOTES
[facility]   Hospitalist Progress Note  Date: 3/23/2025  Patient Name: Vaishali Griffin  : 1970  MRN: [id number]  Date of admission: 3/20/2025  Consultants:   -Urology: Dr. Giancarlo Waters    Subjective   Subjective     Chief Complaint: Weakness and confusion    Summary:   Vaishali Case is a 55 y.o. female with COPD, anxiety/depression, recurrent UTIs, obesity and GERD that presented to ED due to weakness, confusion and dysuria.  Urinalysis consistent with UTI.  Creatinine elevated compared to baseline.  CT abdomen showed appropriately placed ureteral stents bilaterally but despite stenting patient still had bilateral hydronephrosis.  Empiric antibiotics started.  Urology consulted.    Interval Followup:   No acute events overnight.  Creatinine stable   Urology following. May do a stent exchange later in the week  Vitals stable  Patient denies any new symptoms    Antibiotics:   -Zosyn    Pain Medication:   -Percocet    Objective   Objective     Vitals:   Temp:  [97.5 °F (36.4 °C)-98.7 °F (37.1 °C)] 98.1 °F (36.7 °C)  Heart Rate:  [66-94] 78  Resp:  [16-19] 18  BP: ()/(43-79) 122/54  Physical Exam   Gen: Lying in bed, conversant, pleasant  Resp: CTAB, No w/r/r, No respiratory distress appreciated  Card: RRR, No m/r/g  Abd: Soft, Nontender, Nondistended, + bowel sounds    Result Review    Result Review:  I have personally reviewed the results as below and agree with these findings:  []  Laboratory:   CMP          3/21/2025    05:23 3/22/2025    05:21 3/23/2025    05:03   CMP   Glucose 123  129  130    BUN 59  41  29    Creatinine 2.28  1.53  1.35    EGFR 25.1  40.5  47.1    Sodium 134  138  142    Potassium 3.2  3.2  3.2    Chloride 102  107  110    Calcium 8.9  8.6  8.5    Total Protein 7.0      Albumin 2.8      Globulin 4.2      Total Bilirubin 0.5      Alkaline Phosphatase 172      AST (SGOT) 113      ALT (SGPT) 94      Albumin/Globulin Ratio 0.7      BUN/Creatinine Ratio 25.9  26.8  21.5    Anion Gap 13.5   11.9  13.4      CBC          3/20/2025    01:11 3/21/2025    05:23 3/22/2025    05:21   CBC   WBC 10.36  10.16  9.97    RBC 3.90  3.24  3.03    Hemoglobin 9.4  7.9  7.6    Hematocrit 31.1  25.5  23.3    MCV 79.7  78.7  76.9    MCH 24.1  24.4  25.1    MCHC 30.2  31.0  32.6    RDW 15.8  15.9  15.7    Platelets 234  236  304    Hemoglobin A1c: 5.8  [x]  Microbiology: Blood culture (03/20/2025): No growth to date.  Urine culture (03/20/2025): Normal urogenital kodak.  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  Considering possible stent exchange middle of next week  -Continue Zosyn, cultures in process but so far showing no growth  -Replace potassium  -Continue to monitor hemoglobin. No active bleeding noted   -Home medications have been resumed  -PT/OT consulted  -Will monitor electrolytes and renal function with BMP and magnesium level in the AM  -Will monitor WBC and Hgb with CBC in the AM  -Clinical course will dictate further management     DVT Prophylaxis: Heparin  GI Prophylaxis: Pantoprazole  Diet:   Diet Order   Procedures    Diet: Regular/House; Fluid Consistency: Thin (IDDSI 0)     Dispo: PT/OT consult         VTE Prophylaxis:  Pharmacologic VTE prophylaxis orders are present.        CODE STATUS:   Code Status (Patient has no pulse and is not breathing): CPR (Attempt to Resuscitate)  Medical Interventions (Patient has pulse or is breathing): Full Support  Level Of Support Discussed With: Patient

## 2025-03-23 NOTE — PLAN OF CARE
Goal Outcome Evaluation:  Plan of Care Reviewed With: patient        Progress: no change  Outcome Evaluation: VSS. Medicated for pain x1. IV ABX given per orders. Plan of care ongoing.

## 2025-03-24 PROBLEM — N13.5 OBSTRUCTION OF BOTH URETERS: Status: ACTIVE | Noted: 2025-03-19

## 2025-03-24 LAB
ANION GAP SERPL CALCULATED.3IONS-SCNC: 9.9 MMOL/L (ref 5–15)
BUN SERPL-MCNC: 22 MG/DL (ref 6–20)
BUN/CREAT SERPL: 20.8 (ref 7–25)
CALCIUM SPEC-SCNC: 8.8 MG/DL (ref 8.6–10.5)
CHLORIDE SERPL-SCNC: 112 MMOL/L (ref 98–107)
CO2 SERPL-SCNC: 18.1 MMOL/L (ref 22–29)
CREAT SERPL-MCNC: 1.06 MG/DL (ref 0.57–1)
DEPRECATED RDW RBC AUTO: 45.9 FL (ref 37–54)
EGFRCR SERPLBLD CKD-EPI 2021: 62.9 ML/MIN/1.73
ERYTHROCYTE [DISTWIDTH] IN BLOOD BY AUTOMATED COUNT: 16 % (ref 12.3–15.4)
GLUCOSE SERPL-MCNC: 105 MG/DL (ref 65–99)
HCT VFR BLD AUTO: 22 % (ref 34–46.6)
HGB BLD-MCNC: 7.2 G/DL (ref 12–15.9)
MAGNESIUM SERPL-MCNC: 1.9 MG/DL (ref 1.6–2.6)
MCH RBC QN AUTO: 25.5 PG (ref 26.6–33)
MCHC RBC AUTO-ENTMCNC: 32.7 G/DL (ref 31.5–35.7)
MCV RBC AUTO: 78 FL (ref 79–97)
PLATELET # BLD AUTO: 448 10*3/MM3 (ref 140–450)
PMV BLD AUTO: 12.5 FL (ref 6–12)
POTASSIUM SERPL-SCNC: 3.3 MMOL/L (ref 3.5–5.2)
RBC # BLD AUTO: 2.82 10*6/MM3 (ref 3.77–5.28)
SODIUM SERPL-SCNC: 140 MMOL/L (ref 136–145)
VANCOMYCIN SERPL-MCNC: 14.21 MCG/ML (ref 5–40)
WBC NRBC COR # BLD AUTO: 7.73 10*3/MM3 (ref 3.4–10.8)

## 2025-03-24 PROCEDURE — 94664 DEMO&/EVAL PT USE INHALER: CPT

## 2025-03-24 PROCEDURE — 25010000002 PIPERACILLIN SOD-TAZOBACTAM PER 1 G: Performed by: INTERNAL MEDICINE

## 2025-03-24 PROCEDURE — 99232 SBSQ HOSP IP/OBS MODERATE 35: CPT | Performed by: UROLOGY

## 2025-03-24 PROCEDURE — 25810000003 SODIUM CHLORIDE 0.9 % SOLUTION: Performed by: INTERNAL MEDICINE

## 2025-03-24 PROCEDURE — 94799 UNLISTED PULMONARY SVC/PX: CPT

## 2025-03-24 PROCEDURE — 25010000002 POTASSIUM CHLORIDE 10 MEQ/100ML SOLUTION: Performed by: STUDENT IN AN ORGANIZED HEALTH CARE EDUCATION/TRAINING PROGRAM

## 2025-03-24 PROCEDURE — 25010000002 HEPARIN (PORCINE) PER 1000 UNITS: Performed by: FAMILY MEDICINE

## 2025-03-24 PROCEDURE — 83735 ASSAY OF MAGNESIUM: CPT | Performed by: STUDENT IN AN ORGANIZED HEALTH CARE EDUCATION/TRAINING PROGRAM

## 2025-03-24 PROCEDURE — 97165 OT EVAL LOW COMPLEX 30 MIN: CPT

## 2025-03-24 PROCEDURE — 25010000002 VANCOMYCIN 5 G RECONSTITUTED SOLUTION: Performed by: INTERNAL MEDICINE

## 2025-03-24 PROCEDURE — 85027 COMPLETE CBC AUTOMATED: CPT | Performed by: INTERNAL MEDICINE

## 2025-03-24 PROCEDURE — 80048 BASIC METABOLIC PNL TOTAL CA: CPT | Performed by: INTERNAL MEDICINE

## 2025-03-24 PROCEDURE — 80202 ASSAY OF VANCOMYCIN: CPT | Performed by: INTERNAL MEDICINE

## 2025-03-24 PROCEDURE — 99232 SBSQ HOSP IP/OBS MODERATE 35: CPT | Performed by: STUDENT IN AN ORGANIZED HEALTH CARE EDUCATION/TRAINING PROGRAM

## 2025-03-24 PROCEDURE — 94760 N-INVAS EAR/PLS OXIMETRY 1: CPT

## 2025-03-24 RX ORDER — POTASSIUM CHLORIDE 750 MG/1
40 CAPSULE, EXTENDED RELEASE ORAL ONCE
Status: COMPLETED | OUTPATIENT
Start: 2025-03-24 | End: 2025-03-24

## 2025-03-24 RX ORDER — VANCOMYCIN/0.9 % SOD CHLORIDE 1.5G/250ML
1500 PLASTIC BAG, INJECTION (ML) INTRAVENOUS EVERY 24 HOURS
Status: DISCONTINUED | OUTPATIENT
Start: 2025-03-25 | End: 2025-03-25

## 2025-03-24 RX ORDER — POTASSIUM CHLORIDE 7.45 MG/ML
10 INJECTION INTRAVENOUS ONCE
Status: COMPLETED | OUTPATIENT
Start: 2025-03-24 | End: 2025-03-24

## 2025-03-24 RX ADMIN — MIRTAZAPINE 15 MG: 15 TABLET, FILM COATED ORAL at 21:37

## 2025-03-24 RX ADMIN — SODIUM CHLORIDE 3.38 G: 9 INJECTION, SOLUTION INTRAVENOUS at 11:24

## 2025-03-24 RX ADMIN — IPRATROPIUM BROMIDE AND ALBUTEROL SULFATE 3 ML: .5; 3 SOLUTION RESPIRATORY (INHALATION) at 00:15

## 2025-03-24 RX ADMIN — ESCITALOPRAM OXALATE 10 MG: 10 TABLET ORAL at 08:40

## 2025-03-24 RX ADMIN — HEPARIN SODIUM 5000 UNITS: 5000 INJECTION INTRAVENOUS; SUBCUTANEOUS at 21:37

## 2025-03-24 RX ADMIN — POTASSIUM CHLORIDE 40 MEQ: 750 CAPSULE, EXTENDED RELEASE ORAL at 08:39

## 2025-03-24 RX ADMIN — PANTOPRAZOLE SODIUM 40 MG: 40 TABLET, DELAYED RELEASE ORAL at 08:40

## 2025-03-24 RX ADMIN — TERIPARATIDE 20 MCG: 250 INJECTION, SOLUTION SUBCUTANEOUS at 11:23

## 2025-03-24 RX ADMIN — Medication 10 ML: at 21:37

## 2025-03-24 RX ADMIN — BUPROPION HYDROCHLORIDE 150 MG: 150 TABLET, EXTENDED RELEASE ORAL at 08:40

## 2025-03-24 RX ADMIN — HEPARIN SODIUM 5000 UNITS: 5000 INJECTION INTRAVENOUS; SUBCUTANEOUS at 08:40

## 2025-03-24 RX ADMIN — VANCOMYCIN HYDROCHLORIDE 1250 MG: 5 INJECTION, POWDER, LYOPHILIZED, FOR SOLUTION INTRAVENOUS at 09:17

## 2025-03-24 RX ADMIN — NYSTATIN: 100000 POWDER TOPICAL at 21:37

## 2025-03-24 RX ADMIN — IPRATROPIUM BROMIDE AND ALBUTEROL SULFATE 3 ML: .5; 3 SOLUTION RESPIRATORY (INHALATION) at 19:04

## 2025-03-24 RX ADMIN — IPRATROPIUM BROMIDE AND ALBUTEROL SULFATE 3 ML: .5; 3 SOLUTION RESPIRATORY (INHALATION) at 13:08

## 2025-03-24 RX ADMIN — CARIPRAZINE 3 MG: 1.5 CAPSULE, GELATIN COATED ORAL at 08:39

## 2025-03-24 RX ADMIN — IPRATROPIUM BROMIDE AND ALBUTEROL SULFATE 3 ML: .5; 3 SOLUTION RESPIRATORY (INHALATION) at 06:49

## 2025-03-24 RX ADMIN — SODIUM CHLORIDE 3.38 G: 9 INJECTION, SOLUTION INTRAVENOUS at 17:16

## 2025-03-24 RX ADMIN — Medication 250 MG: at 21:37

## 2025-03-24 RX ADMIN — OXCARBAZEPINE 150 MG: 150 TABLET, FILM COATED ORAL at 08:40

## 2025-03-24 RX ADMIN — Medication 10 ML: at 08:41

## 2025-03-24 RX ADMIN — POTASSIUM CHLORIDE 10 MEQ: 7.46 INJECTION, SOLUTION INTRAVENOUS at 08:39

## 2025-03-24 RX ADMIN — OXYCODONE HYDROCHLORIDE AND ACETAMINOPHEN 1 TABLET: 10; 325 TABLET ORAL at 09:17

## 2025-03-24 RX ADMIN — Medication 250 MG: at 08:40

## 2025-03-24 RX ADMIN — SODIUM CHLORIDE 3.38 G: 9 INJECTION, SOLUTION INTRAVENOUS at 01:47

## 2025-03-24 RX ADMIN — NYSTATIN: 100000 POWDER TOPICAL at 09:22

## 2025-03-24 RX ADMIN — OXCARBAZEPINE 150 MG: 150 TABLET, FILM COATED ORAL at 21:37

## 2025-03-24 NOTE — PROGRESS NOTES
[facility]   Hospitalist Progress Note  Date: 3/24/2025  Patient Name: Vaishali Griffin  : 1970  MRN: [id number]  Date of admission: 3/20/2025  Room/Bed: 360/1      Subjective   Subjective     Chief Complaint: Weakness and confusion    Summary:    Vaishali Case is a 55 y.o. female with COPD, anxiety/depression, recurrent UTIs, obesity and GERD that presented to ED due to weakness, confusion and dysuria.  Urinalysis consistent with UTI.  Creatinine elevated compared to baseline.  CT abdomen showed appropriately placed ureteral stents bilaterally but despite stenting patient still had bilateral hydronephrosis.  Empiric antibiotics started.  Urology consulted.     Interval Followup:   Plan for cystoscopy and stent exchange on Wednesday with urology.  Patient does not have any new complaint.  Creatinine improved.      All systems reviewed and negative except for what is outlined above.      Objective   Objective     Vitals:   Temp:  [97.9 °F (36.6 °C)-98.4 °F (36.9 °C)] 98.2 °F (36.8 °C)  Heart Rate:  [] 69  Resp:  [18-20] 18  BP: ()/(45-62) 106/52    Physical Exam   General: NAD  Cardiovascular: RRR  Pulmonary: no conversational dyspnea  Gastrointestinal: S/ND/NT  Psych: Mood and affect appropriate    Result Review    Result Review:  I have personally reviewed these results:  [x]  Laboratory      Lab 25  0436 25  0521 25  0523 25  0111   WBC 7.73 9.97 10.16 10.36   HEMOGLOBIN 7.2* 7.6* 7.9* 9.4*   HEMATOCRIT 22.0* 23.3* 25.5* 31.1*   PLATELETS 448 304 236 234   NEUTROS ABS  --   --   --  8.49*   IMMATURE GRANS (ABS)  --   --   --  0.13*   LYMPHS ABS  --   --   --  0.56*   MONOS ABS  --   --   --  1.14*   EOS ABS  --   --   --  0.00   MCV 78.0* 76.9* 78.7* 79.7   PROCALCITONIN  --   --   --  1.40*   LACTATE  --   --   --  1.6         Lab 25  0436 25  0503 25  0521 25  0523 25  0111   SODIUM 140 142 138   < > 131*   POTASSIUM 3.3* 3.2* 3.2*   < > 3.6    CHLORIDE 112* 110* 107   < > 96*   CO2 18.1* 18.6* 19.1*   < > 20.5*   ANION GAP 9.9 13.4 11.9   < > 14.5   BUN 22* 29* 41*   < > 49*   CREATININE 1.06* 1.35* 1.53*   < > 1.93*   EGFR 62.9 47.1* 40.5*   < > 30.7*   GLUCOSE 105* 130* 129*   < > 142*   CALCIUM 8.8 8.5* 8.6   < > 9.5   MAGNESIUM 1.9  --  2.2  --   --    PHOSPHORUS  --   --  2.3*  --   --    HEMOGLOBIN A1C  --   --   --   --  5.80*    < > = values in this interval not displayed.         Lab 03/21/25  0523 03/20/25  0111   TOTAL PROTEIN 7.0 8.0   ALBUMIN 2.8* 3.3*   GLOBULIN 4.2 4.7   ALT (SGPT) 94* 123*   AST (SGOT) 113* 201*   BILIRUBIN 0.5 0.4   ALK PHOS 172* 189*   LIPASE  --  30                     Brief Urine Lab Results  (Last result in the past 365 days)        Color   Clarity   Blood   Leuk Est   Nitrite   Protein   CREAT   Urine HCG        03/20/25 0111 Dark Yellow   Turbid   Large (3+)   Large (3+)   Negative   100 mg/dL (2+)                 [x]  Microbiology   Microbiology Results (last 10 days)       Procedure Component Value - Date/Time    Blood Culture - Blood, Arm, Right [[id number]]  (Normal) Collected: 03/20/25 1839    Lab Status: Preliminary result Specimen: Blood from Arm, Right Updated: 03/23/25 1900     Blood Culture No growth at 3 days    Blood Culture - Blood, Hand, Right [[id number]]  (Normal) Collected: 03/20/25 1839    Lab Status: Preliminary result Specimen: Blood from Hand, Right Updated: 03/23/25 1900     Blood Culture No growth at 3 days    COVID-19, FLU A/B, RSV PCR 1 HR TAT - Swab, Nasopharynx [[id number]]  (Normal) Collected: 03/20/25 0143    Lab Status: Final result Specimen: Swab from Nasopharynx Updated: 03/20/25 0233     COVID19 Not Detected     Influenza A PCR Not Detected     Influenza B PCR Not Detected     RSV, PCR Not Detected    Narrative:      Fact sheet for providers: https://www.fda.gov/media/778482/download    Fact sheet for patients: https://www.fda.gov/media/527734/download    Test performed by PCR.     Blood Culture - Blood, Arm, Right [[id number]]  (Normal) Collected: 03/20/25 0125    Lab Status: Preliminary result Specimen: Blood from Arm, Right Updated: 03/24/25 0130     Blood Culture No growth at 4 days    Narrative:      Less than seven (7) mL's of blood was collected.  Insufficient quantity may yield false negative results.    Blood Culture - Blood, Arm, Left [[id number]]  (Normal) Collected: 03/20/25 0125    Lab Status: Preliminary result Specimen: Blood from Arm, Left Updated: 03/24/25 0130     Blood Culture No growth at 4 days    Narrative:      Less than seven (7) mL's of blood was collected.  Insufficient quantity may yield false negative results.    Urine Culture - Urine, Urine, Clean Catch [[id number]] Collected: 03/20/25 0111    Lab Status: Final result Specimen: Urine, Clean Catch Updated: 03/21/25 1132     Urine Culture >100,000 CFU/mL Normal Urogenital Chen    Narrative:      Colonization of the urinary tract without infection is common. Treatment is discouraged unless the patient is symptomatic, pregnant, or undergoing an invasive urologic procedure.          [x]  Radiology  US Liver  Result Date: 3/20/2025  Impression: 1.Cholelithiasis without definite sonographic evidence of acute cholecystitis. 2.Moderate right-sided hydronephrosis. Electronically Signed: Bernabe Calvert MD  3/20/2025 11:01 AM EDT  Workstation ID: [id number]    CT Abdomen Pelvis Without Contrast  Result Date: 3/20/2025  1.Bilateral ureteral stents are again identified. These have likely been replaced since the prior study and are more proximally located with the left in the renal pelvis and on the right near the UPJ. Despite the stents, there is persistent moderate bilateral hydronephrosis and hydroureter that is similar to the previous study. There is stranding around both kidneys in addition to stranding along the courses of both ureters. Ascending urinary tract infection is not excluded. 2.Colonic diverticulosis without  diverticulitis. Normal appendix. No bowel obstruction. 3.Additional chronic findings as described above. 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 No active bleeding noted     #Anxiety  Lexapro  Wellbutrin  Remeron  Trileptal  Vraylar    #COPD, not acute exacerbate  DuoNeb    #GERD  Pantoprazole     DVT Prophylaxis: Heparin     Discussed with RN.    VTE Prophylaxis:  Mechanical VTE prophylaxis orders are signed & held. Pharmacologic VTE prophylaxis orders are present.        CODE STATUS:   Code Status (Patient has no pulse and is not breathing): CPR (Attempt to Resuscitate)  Medical Interventions (Patient has pulse or is breathing): Full Support  Level Of Support Discussed With: Patient      Electronically signed by Fili Stewart MD, 3/24/2025, 11:56 EDT.

## 2025-03-24 NOTE — PLAN OF CARE
Goal Outcome Evaluation:  Plan of Care Reviewed With: patient        Progress: no change  Outcome Evaluation: VSS. IV ABX given per orders. No significant changes this shift. Plan of care ongoing.

## 2025-03-24 NOTE — PROGRESS NOTES
"[facility] Clinical Pharmacy Services: Vancomycin Monitoring Note  Vaishali Griffin is a 55 y.o. female who is on day  of pharmacy to dose vancomycin for Bacteremia - blood cultures with no growth, concern for recurrent UTI in patient with bilateral ureteral stents. Per provider notation, plan for cystoscopy with bilateral ureteral stent exchange on Wednesday.    Imaging Reviewed?: Yes  Updated Cultures and Sensitivities:   3/20 blood cx NGTD  3/20 blood cx NGTD  3/20 urine cx normal kodak    Vitals/Labs  Ht: 170.2 cm (67\"); Wt: 102 kg (225 lb 1.4 oz)   Temp (24hrs), Av.2 °F (36.8 °C), Min:97.9 °F (36.6 °C), Max:98.4 °F (36.9 °C)   Estimated Creatinine Clearance: 75.4 mL/min (A) (by C-G formula based on SCr of 1.06 mg/dL (H)).     Results from last 7 days   Lab Units 25  0436 25  0503 25  0521 25  0523   VANCOMYCIN RM mcg/mL 14.21  --  10.67 29.00   CREATININE mg/dL 1.06* 1.35* 1.53* 2.28*   WBC 10*3/mm3 7.73  --  9.97 10.16     Assessment/Plan   Vancomycin Dose: Level this AM resulted at 14.21 mcg/mL, given improvement in Scr, will increase dosing to vancomycin  1500 mg IV every 24 hours; which provides the following predicted parameters:  AUC24,ss: 505 mg/L.hr  Probability of AUC24 > 400: 99 %  Ctrough,ss: 12.4 mg/L  Probability of Ctrough,ss > 20: 0 %  Probability of nephrotoxicity (Lodise SATISH ): 8 %  Defer ordering vancomycin level at this time - please order for 3/27 AM if vancomycin duration is extended  We will continue to monitor patient changes and renal function - BMP x 2 days    Thank you for involving pharmacy in this patient's care. Please contact pharmacy with any questions or concerns.  Mame Blandon [facility]  Clinical Pharmacist                "

## 2025-03-24 NOTE — PROGRESS NOTES
[facility]   Urology Progress Note    Patient Name: Vaishali Griffin  : 1970  MRN: [id number]  Primary Care Physician:  Elisabeth Potter APRN  Date of admission: 3/20/2025    Subjective   Subjective       Feeling some better  No events    Objective   Objective     Vitals:   Temp:  [97.9 °F (36.6 °C)-98.2 °F (36.8 °C)] 97.9 °F (36.6 °C)  Heart Rate:  [] 68  Resp:  [18-20] 18  BP: ()/(45-62) 91/62  Physical Exam     Alert and oriented x3  No acute distress  Unlabored respirations  Nontender/nondistended       Result Review    Result Review:  I have personally reviewed the results from the time of this admission to 3/24/2025 06:15 EDT and agree with these findings:  []  Laboratory  []  Microbiology  []  Radiology  []  EKG/Telemetry   []  Cardiology/Vascular   []  Pathology  []  Old records  []  Other:      Assessment & Plan   Assessment / Plan     Brief Patient Summary:  Vaishali Griffin is a 55 y.o. female     Active Hospital Problems:  Active Hospital Problems    Diagnosis     **Generalized weakness     AMS (altered mental status)     Recurrent UTI      Plan:        urinary sepsis  Recurrent UTI  Bilateral ureteral obstruction with chronic indwelling ureteral stent  history of stage IA2 grade 1 squamous cell carcinoma of the cervix           Will plan on cystoscopy with bilateral ureteral stent exchange for Wednesday.  Risks and benefits were discussed including bleeding, infection and damage to the urinary system.  We also discussed the risk of anesthesia up to and including death.  Patient voiced understanding and would like to proceed.    N.p.o. tomorrow night       Will Follow

## 2025-03-24 NOTE — THERAPY EVALUATION
"Patient Name: Vaishali Griffin  : 1970    MRN: [id number]                              Today's Date: 3/24/2025       Admit Date: 3/20/2025    Visit Dx:     ICD-10-CM ICD-9-CM   1. Acute pyelonephritis  N10 590.10   2. Difficulty walking  R26.2 719.7   3. Recurrent UTI  N39.0 599.0   4. Obstruction of both ureters  N13.5 593.4   5. Decreased activities of daily living (ADL)  Z78.9 V49.89     Patient Active Problem List   Diagnosis    AMS (altered mental status)    Generalized weakness    Recurrent UTI    Obstruction of both ureters     Past Medical History:   Diagnosis Date    Anxiety     Back pain     Chronic pain     COPD (chronic obstructive pulmonary disease)     Depression     Ex-smoker     QUIT 2023    Falls     \"WHEN I GO OUT\"    History of back surgery     2024    Osteoporosis     Pain management     IN [place]     Past Surgical History:   Procedure Laterality Date    BACK SURGERY        General Information       Row Name 25 1113          OT Time and Intention    Document Type evaluation  -AV     Mode of Treatment individual therapy;occupational therapy  -AV     Patient Effort good  -AV       Row Name 25 1113          General Information    Patient Profile Reviewed yes  -AV     Prior Level of Function independent:;ADL's;transfer;all household mobility  Sits to bathe (tub with seat).  Sits to groom.  Standard commode.  Uses reacher.  Ambulates with rollator.  No home oxygen.  Limited standing tolerance since back surgery in September.  -AV     Existing Precautions/Restrictions fall;NPO  -AV     Barriers to Rehab none identified  -AV       Row Name 25 1113          Occupational Profile    Reason for Services/Referral (Occupational Profile) Patient is a 55 year old female admitted to [facility] on 3/20/2025 with weakness and confusion.  She is currently on 3W/room air.   OT consulted due to recent decline in ADL/transfer independence.  No previous OT services for current " condition.  -AV       Row Name 03/24/25 1113          Living Environment    People in Home spouse  Sister and her   -AV       Row Name 03/24/25 1113          Home Main Entrance    Number of Stairs, Main Entrance none  -AV       Row Name 03/24/25 1113          Stairs Within Home, Primary    Number of Stairs, Within Home, Primary none  -AV       Row Name 03/24/25 1113          Cognition    Orientation Status (Cognition) --  Patient is alert, pleasant and cooperative- able to retain information and follow commands.  -AV       Row Name 03/24/25 1113          Safety Issues/Impairments Affecting Functional Mobility    Impairments Affecting Function (Mobility) balance;endurance/activity tolerance  -AV               User Key  (r) = Recorded By, (t) = Taken By, (c) = Cosigned By      Initials Name Provider Type    Shamir Downs OT Occupational Therapist                     Mobility/ADL's       Row Name 03/24/25 1114          Transfers    Transfers sit-stand transfer  -AV       Row Name 03/24/25 1114          Sit-Stand Transfer    Sit-Stand [place] (Transfers) minimum assist (75% patient effort)  -AV       Row Name 03/24/25 1114          Activities of Daily Living    BADL Assessment/Intervention --  Independent feeding (currently NPO).  Independent grooming with set up while seated.  Mod assist bathing/dressing.  Min assist toilet hygiene using BSC.  -AV               User Key  (r) = Recorded By, (t) = Taken By, (c) = Cosigned By      Initials Name Provider Type    Shamir Downs OT Occupational Therapist                   Obj/Interventions       Row Name 03/24/25 1115          Sensory Assessment (Somatosensory)    Sensory Assessment (Somatosensory) UE sensation intact  -AV       Row Name 03/24/25 1115          Vision Assessment/Intervention    Visual Impairment/Limitations WFL;corrective lenses full-time  -AV       Row Name 03/24/25 1115          Range of Motion Comprehensive    General Range of Motion  bilateral upper extremity ROM WFL  -AV     Comment, General Range of Motion AROM  -AV       Row Name 03/24/25 1115          Strength Comprehensive (MMT)    Comment, General Manual Muscle Testing (MMT) Assessment 4/5 bilateral biceps, triceps and   -AV       Row Name 03/24/25 1115          Motor Skills    Motor Skills coordination;functional endurance  -AV     Coordination WFL  Right dominant  -AV     Functional Endurance Fair minus  -AV       Row Name 03/24/25 1115          Balance    Balance Assessment standing dynamic balance  -AV     Dynamic Standing Balance contact guard  -AV     Position/Device Used, Standing Balance walker, rolling  -AV               User Key  (r) = Recorded By, (t) = Taken By, (c) = Cosigned By      Initials Name Provider Type    AV Shamir Garcia OT Occupational Therapist                   Goals/Plan       Row Name 03/24/25 1116          Transfer Goal 1 (OT)    Activity/Assistive Device (Transfer Goal 1, OT) transfers, all  -AV     [place] Level/Cues Needed (Transfer Goal 1, OT) modified independence  -AV     Time Frame (Transfer Goal 1, OT) long term goal (LTG);10 days  -AV       Row Name 03/24/25 1116          Bathing Goal 1 (OT)    Activity/Device (Bathing Goal 1, OT) bathing skills, all;tub bench  -AV     [place] Level/Cues Needed (Bathing Goal 1, OT) modified independence  -AV     Time Frame (Bathing Goal 1, OT) long term goal (LTG);10 days  -AV       Row Name 03/24/25 1116          Dressing Goal 1 (OT)    Activity/Device (Dressing Goal 1, OT) dressing skills, all  -AV     [place]/Cues Needed (Dressing Goal 1, OT) modified independence  -AV     Time Frame (Dressing Goal 1, OT) long term goal (LTG);10 days  -AV       Row Name 03/24/25 1116          Toileting Goal 1 (OT)    Activity/Device (Toileting Goal 1, OT) toileting skills, all;raised toilet seat  -AV     [place] Level/Cues Needed (Toileting Goal 1, OT) modified independence  -AV     Time Frame (Toileting  Goal 1, OT) long term goal (LTG);10 days  -AV       Row Name 03/24/25 1116          Problem Specific Goal 1 (OT)    Problem Specific Goal 1 (OT) Patient will demonstrate fair endurance/activity tolerance needed to support ADLs.  -AV     Time Frame (Problem Specific Goal 1, OT) long term goal (LTG);10 days  -AV       Row Name 03/24/25 1116          Therapy Assessment/Plan (OT)    Planned Therapy Interventions (OT) activity tolerance training;BADL retraining;functional balance retraining;occupation/activity based interventions;patient/caregiver education/training;ROM/therapeutic exercise;transfer/mobility retraining  -AV               User Key  (r) = Recorded By, (t) = Taken By, (c) = Cosigned By      Initials Name Provider Type    AV Shamir Garcia OT Occupational Therapist                   Clinical Impression       Row Name 03/24/25 1115          Pain Assessment    Additional Documentation Pain Scale: FACES Pre/Post-Treatment (Group)  -AV       [facility] Name 03/24/25 1115          Pain Scale: FACES Pre/Post-Treatment    Pain: FACES Scale, Pretreatment 0-->no hurt  -AV     Posttreatment Pain Rating 0-->no hurt  -AV       Row Name 03/24/25 1115          Plan of Care Review    Plan of Care Reviewed With patient  -AV     Progress no change  First session for evaluation  -AV     Outcome Evaluation Patient presents with limitations of balance and endurance/activity tolerance which impede her ability to perform ADL and transfers as prior.  The skills of a therapist will be required to safely and effectively implement treatment plan to restore maximal level of function.  -AV       Row Name 03/24/25 1115          Therapy Assessment/Plan (OT)    Patient/Family Therapy Goal Statement (OT) Regain independence  -AV     Rehab Potential (OT) good  -AV     Criteria for Skilled Therapeutic Interventions Met (OT) yes;meets criteria;skilled treatment is necessary  -AV     Therapy Frequency (OT) 5 times/wk  -AV       Row Name 03/24/25  1115          Therapy Plan Review/Discharge Plan (OT)    Equipment Needs Upon Discharge (OT) commode chair;tub bench  -AV     Anticipated Discharge Disposition (OT) inpatient rehabilitation facility  -AV       Row Name 03/24/25 1115          Vital Signs    O2 Delivery Pre Treatment room air  -AV     O2 Delivery Intra Treatment room air  -AV     O2 Delivery Post Treatment room air  -AV       Row Name 03/24/25 1115          Positioning and Restraints    Pre-Treatment Position in bed  -AV     Post Treatment Position bed  -AV     In Bed call light within reach;encouraged to call for assist;exit alarm on  -AV               User Key  (r) = Recorded By, (t) = Taken By, (c) = Cosigned By      Initials Name Provider Type    Shamir Downs OT Occupational Therapist                   Outcome Measures       Row Name 03/24/25 1117          How much help from another is currently needed...    Putting on and taking off regular lower body clothing? 2  -AV     Bathing (including washing, rinsing, and drying) 2  -AV     Toileting (which includes using toilet bed pan or urinal) 3  -AV     Putting on and taking off regular upper body clothing 3  -AV     Taking care of personal grooming (such as brushing teeth) 4  -AV     Eating meals 4  -AV     AM-PAC 6 Clicks Score (OT) 18  -AV       Row Name 03/24/25 0740          How much help from another person do you currently need...    Turning from your back to your side while in flat bed without using bedrails? 4  -HM     Moving from lying on back to sitting on the side of a flat bed without bedrails? 4  -HM     Moving to and from a bed to a chair (including a wheelchair)? 3  -HM     Standing up from a chair using your arms (e.g., wheelchair, bedside chair)? 3  -HM     Climbing 3-5 steps with a railing? 2  -HM     To walk in hospital room? 3  -HM     AM-PAC 6 Clicks Score (PT) 19  -HM     Highest Level of Mobility Goal 6 --> Walk 10 steps or more  -       Row Name 03/24/25 1111           Functional Assessment    Outcome Measure Options AM-PAC 6 Clicks Daily Activity (OT);Optimal Instrument  -AV       Row Name 03/24/25 1117          Optimal Instrument    Optimal Instrument Optimal - 3  -AV     Bending/Stooping 2  -AV     Standing 2  -AV     Reaching 1  -AV     From the list, choose the 3 activities you would most like to be able to do without any difficulty Bending/stooping;Standing;Reaching  -AV     Total Score Optimal - 3 5  -AV               User Key  (r) = Recorded By, (t) = Taken By, (c) = Cosigned By      Initials Name Provider Type    AV Shamir Garcia OT Occupational Therapist     Arina Trent RN Registered Nurse                    Occupational Therapy Education       Title: PT OT SLP Therapies (Done)       Topic: Occupational Therapy (Done)       Point: ADL training (Done)       Learning Progress Summary            Patient Acceptance, E, VU by AV at 3/24/2025 1117                      Point: Home exercise program (Done)       Learning Progress Summary            Patient Acceptance, E, VU by AV at 3/24/2025 1117                      Point: Precautions (Done)       Learning Progress Summary            Patient Acceptance, E, VU by AV at 3/24/2025 1117                      Point: Body mechanics (Done)       Learning Progress Summary            Patient Acceptance, E, VU by AV at 3/24/2025 1117                                      User Key       Initials Effective Dates Name Provider Type Discipline    AV 06/16/21 -  Shamir Garcia OT Occupational Therapist OT                  OT Recommendation and Plan  Planned Therapy Interventions (OT): activity tolerance training, BADL retraining, functional balance retraining, occupation/activity based interventions, patient/caregiver education/training, ROM/therapeutic exercise, transfer/mobility retraining  Therapy Frequency (OT): 5 times/wk  Plan of Care Review  Plan of Care Reviewed With: patient  Progress: no change (First session for  evaluation)  Outcome Evaluation: Patient presents with limitations of balance and endurance/activity tolerance which impede her ability to perform ADL and transfers as prior.  The skills of a therapist will be required to safely and effectively implement treatment plan to restore maximal level of function.     Time Calculation:   Evaluation Complexity (OT)  Review Occupational Profile/Medical/Therapy History Complexity: expanded/moderate complexity  Assessment, Occupational Performance/Identification of Deficit Complexity: 1-3 performance deficits  Clinical Decision Making Complexity (OT): problem focused assessment/low complexity  Overall Complexity of Evaluation (OT): low complexity     Time Calculation- OT       Row Name 03/24/25 1118             Time Calculation- OT    OT Received On 03/24/25  -AV      OT Goal Re-Cert Due Date 04/02/25  -AV         Untimed Charges    OT Eval/Re-eval Minutes 35  -AV         Total Minutes    Untimed Charges Total Minutes 35  -AV       Total Minutes 35  -AV                User Key  (r) = Recorded By, (t) = Taken By, (c) = Cosigned By      Initials Name Provider Type    AV Shamir Garcia OT Occupational Therapist                  Therapy Charges for Today       Code Description Service Date Service Provider Modifiers Qty    [id number]  OT EVAL LOW COMPLEXITY 3 3/24/2025 Shamir Garcia OT GO 1                 Shamir Garcia OT  3/24/2025

## 2025-03-24 NOTE — PLAN OF CARE
Goal Outcome Evaluation:           Progress: no change  Outcome Evaluation: VSS. Medicated x1 for pain. IV antibiotics continued per MAR. No other needs at this time. Will continue plan of care.

## 2025-03-25 LAB
ANION GAP SERPL CALCULATED.3IONS-SCNC: 10.5 MMOL/L (ref 5–15)
BACTERIA SPEC AEROBE CULT: NORMAL
BUN SERPL-MCNC: 18 MG/DL (ref 6–20)
BUN/CREAT SERPL: 14.8 (ref 7–25)
CALCIUM SPEC-SCNC: 8.8 MG/DL (ref 8.6–10.5)
CHLORIDE SERPL-SCNC: 114 MMOL/L (ref 98–107)
CO2 SERPL-SCNC: 17.5 MMOL/L (ref 22–29)
CREAT SERPL-MCNC: 1.22 MG/DL (ref 0.57–1)
DEPRECATED RDW RBC AUTO: 46.5 FL (ref 37–54)
EGFRCR SERPLBLD CKD-EPI 2021: 53.2 ML/MIN/1.73
ERYTHROCYTE [DISTWIDTH] IN BLOOD BY AUTOMATED COUNT: 16.7 % (ref 12.3–15.4)
GLUCOSE SERPL-MCNC: 106 MG/DL (ref 65–99)
HCT VFR BLD AUTO: 23.9 % (ref 34–46.6)
HGB BLD-MCNC: 7.5 G/DL (ref 12–15.9)
MCH RBC QN AUTO: 24.7 PG (ref 26.6–33)
MCHC RBC AUTO-ENTMCNC: 31.4 G/DL (ref 31.5–35.7)
MCV RBC AUTO: 78.6 FL (ref 79–97)
PLATELET # BLD AUTO: 536 10*3/MM3 (ref 140–450)
PMV BLD AUTO: 11.9 FL (ref 6–12)
POTASSIUM SERPL-SCNC: 3.6 MMOL/L (ref 3.5–5.2)
RBC # BLD AUTO: 3.04 10*6/MM3 (ref 3.77–5.28)
SODIUM SERPL-SCNC: 142 MMOL/L (ref 136–145)
WBC NRBC COR # BLD AUTO: 7.38 10*3/MM3 (ref 3.4–10.8)

## 2025-03-25 PROCEDURE — 94664 DEMO&/EVAL PT USE INHALER: CPT

## 2025-03-25 PROCEDURE — 80048 BASIC METABOLIC PNL TOTAL CA: CPT | Performed by: INTERNAL MEDICINE

## 2025-03-25 PROCEDURE — 25010000002 VANCOMYCIN 5 G RECONSTITUTED SOLUTION: Performed by: STUDENT IN AN ORGANIZED HEALTH CARE EDUCATION/TRAINING PROGRAM

## 2025-03-25 PROCEDURE — 25810000003 SODIUM CHLORIDE 0.9 % SOLUTION: Performed by: STUDENT IN AN ORGANIZED HEALTH CARE EDUCATION/TRAINING PROGRAM

## 2025-03-25 PROCEDURE — 25010000002 PIPERACILLIN SOD-TAZOBACTAM PER 1 G: Performed by: INTERNAL MEDICINE

## 2025-03-25 PROCEDURE — 94799 UNLISTED PULMONARY SVC/PX: CPT

## 2025-03-25 PROCEDURE — 25010000002 HEPARIN (PORCINE) PER 1000 UNITS: Performed by: FAMILY MEDICINE

## 2025-03-25 PROCEDURE — 99231 SBSQ HOSP IP/OBS SF/LOW 25: CPT | Performed by: UROLOGY

## 2025-03-25 PROCEDURE — 99232 SBSQ HOSP IP/OBS MODERATE 35: CPT | Performed by: STUDENT IN AN ORGANIZED HEALTH CARE EDUCATION/TRAINING PROGRAM

## 2025-03-25 PROCEDURE — 85027 COMPLETE CBC AUTOMATED: CPT | Performed by: STUDENT IN AN ORGANIZED HEALTH CARE EDUCATION/TRAINING PROGRAM

## 2025-03-25 PROCEDURE — 25810000003 LACTATED RINGERS SOLUTION: Performed by: STUDENT IN AN ORGANIZED HEALTH CARE EDUCATION/TRAINING PROGRAM

## 2025-03-25 RX ADMIN — SODIUM CHLORIDE 3.38 G: 9 INJECTION, SOLUTION INTRAVENOUS at 16:43

## 2025-03-25 RX ADMIN — HEPARIN SODIUM 5000 UNITS: 5000 INJECTION INTRAVENOUS; SUBCUTANEOUS at 21:30

## 2025-03-25 RX ADMIN — MIRTAZAPINE 15 MG: 15 TABLET, FILM COATED ORAL at 21:30

## 2025-03-25 RX ADMIN — IPRATROPIUM BROMIDE AND ALBUTEROL SULFATE 3 ML: .5; 3 SOLUTION RESPIRATORY (INHALATION) at 00:25

## 2025-03-25 RX ADMIN — Medication 250 MG: at 21:30

## 2025-03-25 RX ADMIN — TERIPARATIDE 20 MCG: 250 INJECTION, SOLUTION SUBCUTANEOUS at 11:58

## 2025-03-25 RX ADMIN — Medication 250 MG: at 10:01

## 2025-03-25 RX ADMIN — NYSTATIN: 100000 POWDER TOPICAL at 21:30

## 2025-03-25 RX ADMIN — ESCITALOPRAM OXALATE 10 MG: 10 TABLET ORAL at 10:01

## 2025-03-25 RX ADMIN — Medication 10 ML: at 21:30

## 2025-03-25 RX ADMIN — SODIUM CHLORIDE 3.38 G: 9 INJECTION, SOLUTION INTRAVENOUS at 12:47

## 2025-03-25 RX ADMIN — NYSTATIN: 100000 POWDER TOPICAL at 10:02

## 2025-03-25 RX ADMIN — PANTOPRAZOLE SODIUM 40 MG: 40 TABLET, DELAYED RELEASE ORAL at 10:01

## 2025-03-25 RX ADMIN — OXCARBAZEPINE 150 MG: 150 TABLET, FILM COATED ORAL at 21:30

## 2025-03-25 RX ADMIN — Medication 10 ML: at 10:00

## 2025-03-25 RX ADMIN — BUPROPION HYDROCHLORIDE 150 MG: 150 TABLET, EXTENDED RELEASE ORAL at 10:00

## 2025-03-25 RX ADMIN — OXCARBAZEPINE 150 MG: 150 TABLET, FILM COATED ORAL at 10:01

## 2025-03-25 RX ADMIN — SODIUM CHLORIDE, SODIUM LACTATE, POTASSIUM CHLORIDE, CALCIUM CHLORIDE 500 ML: 20; 30; 600; 310 INJECTION, SOLUTION INTRAVENOUS at 10:01

## 2025-03-25 RX ADMIN — IPRATROPIUM BROMIDE AND ALBUTEROL SULFATE 3 ML: .5; 3 SOLUTION RESPIRATORY (INHALATION) at 07:28

## 2025-03-25 RX ADMIN — IPRATROPIUM BROMIDE AND ALBUTEROL SULFATE 3 ML: .5; 3 SOLUTION RESPIRATORY (INHALATION) at 13:08

## 2025-03-25 RX ADMIN — CARIPRAZINE 3 MG: 1.5 CAPSULE, GELATIN COATED ORAL at 10:00

## 2025-03-25 RX ADMIN — SODIUM CHLORIDE 3.38 G: 9 INJECTION, SOLUTION INTRAVENOUS at 01:55

## 2025-03-25 RX ADMIN — HEPARIN SODIUM 5000 UNITS: 5000 INJECTION INTRAVENOUS; SUBCUTANEOUS at 10:01

## 2025-03-25 RX ADMIN — VANCOMYCIN HYDROCHLORIDE 1250 MG: 5 INJECTION, POWDER, LYOPHILIZED, FOR SOLUTION INTRAVENOUS at 10:01

## 2025-03-25 NOTE — SIGNIFICANT NOTE
03/25/25 0726   OTHER   Discipline occupational therapist   Rehab Time/Intention   Session Not Performed patient unavailable for treatment  (With respiratory therapy)

## 2025-03-25 NOTE — PROGRESS NOTES
[facility]   Urology Progress Note    Patient Name: Vaishali Griffin  : 1970  MRN: [id number]  Primary Care Physician:  Elisabeth Potter APRN  Date of admission: 3/20/2025    Subjective   Subjective       Feeling some better  No events    Objective   Objective     Vitals:   Temp:  [97.7 °F (36.5 °C)-98.4 °F (36.9 °C)] 98.4 °F (36.9 °C)  Heart Rate:  [60-91] 70  Resp:  [18] 18  BP: ()/(52-60) 94/53  Physical Exam     Alert and oriented x3  No acute distress  Unlabored respirations  Nontender/nondistended       Result Review    Result Review:  I have personally reviewed the results from the time of this admission to 3/25/2025 06:17 EDT and agree with these findings:  []  Laboratory  []  Microbiology  []  Radiology  []  EKG/Telemetry   []  Cardiology/Vascular   []  Pathology  []  Old records  []  Other:      Assessment & Plan   Assessment / Plan     Brief Patient Summary:  Vaishali Griffin is a 55 y.o. 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  We also discussed the risk of anesthesia up to and including death.  Patient voiced understanding and would like to proceed.    N.p.o. after midnight

## 2025-03-25 NOTE — PLAN OF CARE
Goal Outcome Evaluation:           Progress: no change  Outcome Evaluation: Patient A&O. VSS. No complaints of pain this shift. Continued IV Abx per MAR. 500 mL LR bolus given. Ureteal stents planned to be placed tommrow. No other needs at this time. Will continue plan of care.

## 2025-03-25 NOTE — PLAN OF CARE
Goal Outcome Evaluation:              Outcome Evaluation: Patient A&O. VS stable. No c/o pain or discomfort on shift. Medications administered per MAR. No changes or events to report. All needs met at this time. Plan of care ongoing.

## 2025-03-25 NOTE — PROGRESS NOTES
[facility]   Hospitalist Progress Note  Date: 3/25/2025  Patient Name: Vaishali Griffin  : 1970  MRN: [id number]  Date of admission: 3/20/2025  Room/Bed: 360/1      Subjective   Subjective     Chief Complaint: Weakness and confusion    Summary:    Vaishali Case is a 55 y.o. female with COPD, anxiety/depression, recurrent UTIs, obesity and GERD that presented to ED due to weakness, confusion and dysuria.  Urinalysis consistent with UTI.  Creatinine elevated compared to baseline.  CT abdomen showed appropriately placed ureteral stents bilaterally but despite stenting patient still had bilateral hydronephrosis.  Empiric antibiotics started.  Urology consulted.  For cystoscopy and stent exchange tomorrow.     Interval Followup:   Patient is without new complaints.  PT recommended subacute rehab.  Patient has a bed on Thursday pending hospital course.    All systems reviewed and negative except for what is outlined above.      Objective   Objective     Vitals:   Temp:  [97.3 °F (36.3 °C)-98.4 °F (36.9 °C)] 97.3 °F (36.3 °C)  Heart Rate:  [65-91] 78  Resp:  [18] 18  BP: ()/(46-60) 104/51  Flow (L/min) (Oxygen Therapy):  [0] 0    Physical Exam   General: NAD  Cardiovascular: Normal S1, S2  Pulmonary: Normal work of breathing  Psych: Mood and affect appropriate    Result Review    Result Review:  I have personally reviewed these results:  [x]  Laboratory      Lab 25  0455 25  0436 25  0521 25  0523 25  0111   WBC 7.38 7.73 9.97   < > 10.36   HEMOGLOBIN 7.5* 7.2* 7.6*   < > 9.4*   HEMATOCRIT 23.9* 22.0* 23.3*   < > 31.1*   PLATELETS 536* 448 304   < > 234   NEUTROS ABS  --   --   --   --  8.49*   IMMATURE GRANS (ABS)  --   --   --   --  0.13*   LYMPHS ABS  --   --   --   --  0.56*   MONOS ABS  --   --   --   --  1.14*   EOS ABS  --   --   --   --  0.00   MCV 78.6* 78.0* 76.9*   < > 79.7   PROCALCITONIN  --   --   --   --  1.40*   LACTATE  --   --   --   --  1.6    < > = values in this  interval not displayed.         Lab 03/25/25  0455 03/24/25  0436 03/23/25  0503 03/22/25  0521 03/21/25  0523 03/20/25  0111   SODIUM 142 140 142 138   < > 131*   POTASSIUM 3.6 3.3* 3.2* 3.2*   < > 3.6   CHLORIDE 114* 112* 110* 107   < > 96*   CO2 17.5* 18.1* 18.6* 19.1*   < > 20.5*   ANION GAP 10.5 9.9 13.4 11.9   < > 14.5   BUN 18 22* 29* 41*   < > 49*   CREATININE 1.22* 1.06* 1.35* 1.53*   < > 1.93*   EGFR 53.2* 62.9 47.1* 40.5*   < > 30.7*   GLUCOSE 106* 105* 130* 129*   < > 142*   CALCIUM 8.8 8.8 8.5* 8.6   < > 9.5   MAGNESIUM  --  1.9  --  2.2  --   --    PHOSPHORUS  --   --   --  2.3*  --   --    HEMOGLOBIN A1C  --   --   --   --   --  5.80*    < > = values in this interval not displayed.         Lab 03/21/25  0523 03/20/25  0111   TOTAL PROTEIN 7.0 8.0   ALBUMIN 2.8* 3.3*   GLOBULIN 4.2 4.7   ALT (SGPT) 94* 123*   AST (SGOT) 113* 201*   BILIRUBIN 0.5 0.4   ALK PHOS 172* 189*   LIPASE  --  30                     Brief Urine Lab Results  (Last result in the past 365 days)        Color   Clarity   Blood   Leuk Est   Nitrite   Protein   CREAT   Urine HCG        03/20/25 0111 Dark Yellow   Turbid   Large (3+)   Large (3+)   Negative   100 mg/dL (2+)                 [x]  Microbiology   Microbiology Results (last 10 days)       Procedure Component Value - Date/Time    Blood Culture - Blood, Arm, Right [[id number]]  (Normal) Collected: 03/20/25 1839    Lab Status: Preliminary result Specimen: Blood from Arm, Right Updated: 03/24/25 1900     Blood Culture No growth at 4 days    Blood Culture - Blood, Hand, Right [[id number]]  (Normal) Collected: 03/20/25 1839    Lab Status: Preliminary result Specimen: Blood from Hand, Right Updated: 03/24/25 1900     Blood Culture No growth at 4 days    COVID-19, FLU A/B, RSV PCR 1 HR TAT - Swab, Nasopharynx [[id number]]  (Normal) Collected: 03/20/25 0143    Lab Status: Final result Specimen: Swab from Nasopharynx Updated: 03/20/25 0233     COVID19 Not Detected     Influenza A PCR Not  Detected     Influenza B PCR Not Detected     RSV, PCR Not Detected    Narrative:      Fact sheet for providers: https://www.fda.gov/media/410937/download    Fact sheet for patients: https://www.fda.gov/media/125889/download    Test performed by PCR.    Blood Culture - Blood, Arm, Right [[id number]]  (Normal) Collected: 03/20/25 0125    Lab Status: Final result Specimen: Blood from Arm, Right Updated: 03/25/25 0130     Blood Culture No growth at 5 days    Narrative:      Less than seven (7) mL's of blood was collected.  Insufficient quantity may yield false negative results.    Blood Culture - Blood, Arm, Left [[id number]]  (Normal) Collected: 03/20/25 0125    Lab Status: Final result Specimen: Blood from Arm, Left Updated: 03/25/25 0130     Blood Culture No growth at 5 days    Narrative:      Less than seven (7) mL's of blood was collected.  Insufficient quantity may yield false negative results.    Urine Culture - Urine, Urine, Clean Catch [[id number]] Collected: 03/20/25 0111    Lab Status: Final result Specimen: Urine, Clean Catch Updated: 03/21/25 1132     Urine Culture >100,000 CFU/mL Normal Urogenital Chen    Narrative:      Colonization of the urinary tract without infection is common. Treatment is discouraged unless the patient is symptomatic, pregnant, or undergoing an invasive urologic procedure.          [x]  Radiology  US Liver  Result Date: 3/20/2025  Impression: 1.Cholelithiasis without definite sonographic evidence of acute cholecystitis. 2.Moderate right-sided hydronephrosis. Electronically Signed: Bernabe Calvert MD  3/20/2025 11:01 AM EDT  Workstation ID: [id number]    CT Abdomen Pelvis Without Contrast  Result Date: 3/20/2025  1.Bilateral ureteral stents are again identified. These have likely been replaced since the prior study and are more proximally located with the left in the renal pelvis and on the right near the UPJ. Despite the stents, there is persistent moderate bilateral hydronephrosis and  hydroureter that is similar to the previous study. There is stranding around both kidneys in addition to stranding along the courses of both ureters. Ascending urinary tract infection is not excluded. 2.Colonic diverticulosis without diverticulitis. Normal appendix. No bowel obstruction. 3.Additional chronic findings as described above. Electronically Signed: Joce Joyner MD  3/20/2025 3:25 AM EDT  Workstation ID: [id number]    []  EKG/Telemetry   []  Cardiology/Vascular   []  Pathology  []  Old records  []  Other:    Assessment & Plan        Assessment and Plan:    #Recurrent UTI due to unknown infectious organism  #Chronic Bilateral ureteral obstruction with chronic indwelling ureteral stent  -Urology following, appreciate recommendations  -Continue Zosyn  -Culture showed normal kodak    #History of stage IA2 grade 1 squamous cell carcinoma the cervix  -Continue to monitor hemoglobin. No active bleeding noted     #Anxiety  Lexapro  Wellbutrin  Remeron  Trileptal  Vraylar    #COPD, not acute exacerbate  DuoNeb    #GERD  Pantoprazole    #Metabolic acidosis  Fluids     DVT Prophylaxis: Heparin     Discussed with RN.    VTE Prophylaxis:  Mechanical VTE prophylaxis orders are signed & held. Pharmacologic VTE prophylaxis orders are present.        CODE STATUS:   Code Status (Patient has no pulse and is not breathing): CPR (Attempt to Resuscitate)  Medical Interventions (Patient has pulse or is breathing): Full Support  Level Of Support Discussed With: Patient      Electronically signed by Fili Stewart MD, 3/25/2025, 13:02 EDT.

## 2025-03-25 NOTE — PROGRESS NOTES
"[facility] Clinical Pharmacy Services: Vancomycin Monitoring Note    Vaishali Griffin is a 55 y.o. female who is on day  of pharmacy to dose vancomycin for Urinary Tract Infection.    Previous Vancomycin Dose:   1250 mg IV every  24  hours  Imaging Reviewed?: Yes  Updated Cultures and Sensitivities:   3/20 blood cx NGTD  3/20 urine cx - Normal kodak    Vitals/Labs  Ht: 170.2 cm (67\"); Wt: 102 kg (225 lb 1.4 oz)   Temp (24hrs), Av °F (36.7 °C), Min:97.5 °F (36.4 °C), Max:98.4 °F (36.9 °C)   Estimated Creatinine Clearance: 65.5 mL/min (A) (by C-G formula based on SCr of 1.22 mg/dL (H)).       Results from last 7 days   Lab Units 25  0455 25  0436 25  0503 25  0521 25  0523   VANCOMYCIN RM mcg/mL  --  14.21  --  10.67 29.00   CREATININE mg/dL 1.22* 1.06* 1.35* 1.53* 2.28*   WBC 10*3/mm3 7.38 7.73  --  9.97 10.16     Assessment/Plan    Current Vancomycin Dose: Does was planned to be increased to 1500mg this am but given bump in srcr, changed back to  1250 mg IV every 24 hours; which provides the following predicted parameters:  AUC24,ss: 472 mg/L.hr  Probability of AUC24 > 400: 96 %  Ctrough,ss: 12.2 mg/L  Probability of Ctrough,ss > 20: 0 %  Probability of nephrotoxicity (Lodise SATISH ): 7 %  Next Vanc Random planned for 3/27 with am labs if vanc continued after stent exchange tomorrow  We will continue to monitor patient changes and renal function     Thank you for involving pharmacy in this patient's care. Please contact pharmacy with any questions or concerns.    Bria Castro [facility]  Clinical Pharmacist    "

## 2025-03-26 ENCOUNTER — ANESTHESIA (OUTPATIENT)
Dept: PERIOP | Facility: HOSPITAL | Age: 55
End: 2025-03-26
Payer: MEDICAID

## 2025-03-26 ENCOUNTER — APPOINTMENT (OUTPATIENT)
Dept: GENERAL RADIOLOGY | Facility: HOSPITAL | Age: 55
DRG: 659 | End: 2025-03-26
Payer: MEDICAID

## 2025-03-26 ENCOUNTER — ANESTHESIA EVENT (OUTPATIENT)
Dept: PERIOP | Facility: HOSPITAL | Age: 55
End: 2025-03-26
Payer: MEDICAID

## 2025-03-26 LAB
ABO GROUP BLD: NORMAL
ABO GROUP BLD: NORMAL
ANION GAP SERPL CALCULATED.3IONS-SCNC: 13.4 MMOL/L (ref 5–15)
BLD GP AB SCN SERPL QL: NEGATIVE
BUN SERPL-MCNC: 13 MG/DL (ref 6–20)
BUN/CREAT SERPL: 12.6 (ref 7–25)
CALCIUM SPEC-SCNC: 8.1 MG/DL (ref 8.6–10.5)
CHLORIDE SERPL-SCNC: 113 MMOL/L (ref 98–107)
CO2 SERPL-SCNC: 18.6 MMOL/L (ref 22–29)
CREAT SERPL-MCNC: 1.03 MG/DL (ref 0.57–1)
DEPRECATED RDW RBC AUTO: 47.1 FL (ref 37–54)
EGFRCR SERPLBLD CKD-EPI 2021: 65.2 ML/MIN/1.73
ERYTHROCYTE [DISTWIDTH] IN BLOOD BY AUTOMATED COUNT: 16.9 % (ref 12.3–15.4)
GLUCOSE SERPL-MCNC: 94 MG/DL (ref 65–99)
HCT VFR BLD AUTO: 21.9 % (ref 34–46.6)
HCT VFR BLD AUTO: 26.5 % (ref 34–46.6)
HGB BLD-MCNC: 6.9 G/DL (ref 12–15.9)
HGB BLD-MCNC: 8.2 G/DL (ref 12–15.9)
MCH RBC QN AUTO: 25 PG (ref 26.6–33)
MCHC RBC AUTO-ENTMCNC: 31.5 G/DL (ref 31.5–35.7)
MCV RBC AUTO: 79.3 FL (ref 79–97)
PLATELET # BLD AUTO: 544 10*3/MM3 (ref 140–450)
PMV BLD AUTO: 11.6 FL (ref 6–12)
POTASSIUM SERPL-SCNC: 3.3 MMOL/L (ref 3.5–5.2)
RBC # BLD AUTO: 2.76 10*6/MM3 (ref 3.77–5.28)
RH BLD: POSITIVE
RH BLD: POSITIVE
SODIUM SERPL-SCNC: 145 MMOL/L (ref 136–145)
T&S EXPIRATION DATE: NORMAL
WBC NRBC COR # BLD AUTO: 6.66 10*3/MM3 (ref 3.4–10.8)

## 2025-03-26 PROCEDURE — 25010000002 VANCOMYCIN 5 G RECONSTITUTED SOLUTION: Performed by: STUDENT IN AN ORGANIZED HEALTH CARE EDUCATION/TRAINING PROGRAM

## 2025-03-26 PROCEDURE — 94799 UNLISTED PULMONARY SVC/PX: CPT

## 2025-03-26 PROCEDURE — 99232 SBSQ HOSP IP/OBS MODERATE 35: CPT | Performed by: STUDENT IN AN ORGANIZED HEALTH CARE EDUCATION/TRAINING PROGRAM

## 2025-03-26 PROCEDURE — 0T788DZ DILATION OF BILATERAL URETERS WITH INTRALUMINAL DEVICE, VIA NATURAL OR ARTIFICIAL OPENING ENDOSCOPIC: ICD-10-PCS | Performed by: UROLOGY

## 2025-03-26 PROCEDURE — 25810000003 SODIUM CHLORIDE 0.9 % SOLUTION: Performed by: STUDENT IN AN ORGANIZED HEALTH CARE EDUCATION/TRAINING PROGRAM

## 2025-03-26 PROCEDURE — 86900 BLOOD TYPING SEROLOGIC ABO: CPT | Performed by: PHYSICIAN ASSISTANT

## 2025-03-26 PROCEDURE — 99231 SBSQ HOSP IP/OBS SF/LOW 25: CPT | Performed by: UROLOGY

## 2025-03-26 PROCEDURE — 25010000002 LIDOCAINE PF 2% 2 % SOLUTION: Performed by: NURSE ANESTHETIST, CERTIFIED REGISTERED

## 2025-03-26 PROCEDURE — 85027 COMPLETE CBC AUTOMATED: CPT | Performed by: STUDENT IN AN ORGANIZED HEALTH CARE EDUCATION/TRAINING PROGRAM

## 2025-03-26 PROCEDURE — 25810000003 SODIUM CHLORIDE 0.9 % SOLUTION: Performed by: NURSE ANESTHETIST, CERTIFIED REGISTERED

## 2025-03-26 PROCEDURE — P9016 RBC LEUKOCYTES REDUCED: HCPCS

## 2025-03-26 PROCEDURE — 85018 HEMOGLOBIN: CPT | Performed by: STUDENT IN AN ORGANIZED HEALTH CARE EDUCATION/TRAINING PROGRAM

## 2025-03-26 PROCEDURE — 94664 DEMO&/EVAL PT USE INHALER: CPT

## 2025-03-26 PROCEDURE — 86901 BLOOD TYPING SEROLOGIC RH(D): CPT | Performed by: PHYSICIAN ASSISTANT

## 2025-03-26 PROCEDURE — 52332 CYSTOSCOPY AND TREATMENT: CPT | Performed by: UROLOGY

## 2025-03-26 PROCEDURE — 86900 BLOOD TYPING SEROLOGIC ABO: CPT

## 2025-03-26 PROCEDURE — 25010000002 METOCLOPRAMIDE PER 10 MG: Performed by: ANESTHESIOLOGY

## 2025-03-26 PROCEDURE — 80048 BASIC METABOLIC PNL TOTAL CA: CPT | Performed by: STUDENT IN AN ORGANIZED HEALTH CARE EDUCATION/TRAINING PROGRAM

## 2025-03-26 PROCEDURE — 85014 HEMATOCRIT: CPT | Performed by: STUDENT IN AN ORGANIZED HEALTH CARE EDUCATION/TRAINING PROGRAM

## 2025-03-26 PROCEDURE — 25010000002 PIPERACILLIN SOD-TAZOBACTAM PER 1 G: Performed by: STUDENT IN AN ORGANIZED HEALTH CARE EDUCATION/TRAINING PROGRAM

## 2025-03-26 PROCEDURE — 36430 TRANSFUSION BLD/BLD COMPNT: CPT

## 2025-03-26 PROCEDURE — 76000 FLUOROSCOPY <1 HR PHYS/QHP: CPT

## 2025-03-26 PROCEDURE — 0TP98DZ REMOVAL OF INTRALUMINAL DEVICE FROM URETER, VIA NATURAL OR ARTIFICIAL OPENING ENDOSCOPIC: ICD-10-PCS | Performed by: UROLOGY

## 2025-03-26 PROCEDURE — 94760 N-INVAS EAR/PLS OXIMETRY 1: CPT

## 2025-03-26 PROCEDURE — 86901 BLOOD TYPING SEROLOGIC RH(D): CPT

## 2025-03-26 PROCEDURE — 25010000002 PROPOFOL 10 MG/ML EMULSION: Performed by: NURSE ANESTHETIST, CERTIFIED REGISTERED

## 2025-03-26 PROCEDURE — 25010000002 PIPERACILLIN SOD-TAZOBACTAM PER 1 G: Performed by: NURSE ANESTHETIST, CERTIFIED REGISTERED

## 2025-03-26 PROCEDURE — 25010000002 MIDAZOLAM PER 1MG: Performed by: ANESTHESIOLOGY

## 2025-03-26 PROCEDURE — 25010000002 HEPARIN (PORCINE) PER 1000 UNITS: Performed by: UROLOGY

## 2025-03-26 PROCEDURE — C2617 STENT, NON-COR, TEM W/O DEL: HCPCS | Performed by: UROLOGY

## 2025-03-26 PROCEDURE — 86850 RBC ANTIBODY SCREEN: CPT | Performed by: PHYSICIAN ASSISTANT

## 2025-03-26 PROCEDURE — 86923 COMPATIBILITY TEST ELECTRIC: CPT

## 2025-03-26 PROCEDURE — 25010000002 PIPERACILLIN SOD-TAZOBACTAM PER 1 G: Performed by: INTERNAL MEDICINE

## 2025-03-26 DEVICE — ST STNT URETRL SIL FILFRM DBLPIG RO 6F 26CM BLK: Type: IMPLANTABLE DEVICE | Site: URETER | Status: FUNCTIONAL

## 2025-03-26 RX ORDER — PROMETHAZINE HYDROCHLORIDE 25 MG/1
25 TABLET ORAL ONCE AS NEEDED
Status: DISCONTINUED | OUTPATIENT
Start: 2025-03-26 | End: 2025-03-26 | Stop reason: HOSPADM

## 2025-03-26 RX ORDER — LIDOCAINE HYDROCHLORIDE 20 MG/ML
INJECTION, SOLUTION EPIDURAL; INFILTRATION; INTRACAUDAL; PERINEURAL AS NEEDED
Status: DISCONTINUED | OUTPATIENT
Start: 2025-03-26 | End: 2025-03-26 | Stop reason: SURG

## 2025-03-26 RX ORDER — POTASSIUM CHLORIDE 750 MG/1
40 CAPSULE, EXTENDED RELEASE ORAL ONCE
Status: COMPLETED | OUTPATIENT
Start: 2025-03-26 | End: 2025-03-26

## 2025-03-26 RX ORDER — ONDANSETRON 2 MG/ML
4 INJECTION INTRAMUSCULAR; INTRAVENOUS ONCE AS NEEDED
Status: DISCONTINUED | OUTPATIENT
Start: 2025-03-26 | End: 2025-03-26 | Stop reason: HOSPADM

## 2025-03-26 RX ORDER — METOCLOPRAMIDE HYDROCHLORIDE 5 MG/ML
10 INJECTION INTRAMUSCULAR; INTRAVENOUS ONCE
Status: COMPLETED | OUTPATIENT
Start: 2025-03-26 | End: 2025-03-26

## 2025-03-26 RX ORDER — SODIUM CHLORIDE 9 MG/ML
INJECTION, SOLUTION INTRAVENOUS CONTINUOUS PRN
Status: DISCONTINUED | OUTPATIENT
Start: 2025-03-26 | End: 2025-03-26 | Stop reason: SURG

## 2025-03-26 RX ORDER — MIDAZOLAM HYDROCHLORIDE 2 MG/2ML
2 INJECTION, SOLUTION INTRAMUSCULAR; INTRAVENOUS ONCE
Status: COMPLETED | OUTPATIENT
Start: 2025-03-26 | End: 2025-03-26

## 2025-03-26 RX ORDER — OXYCODONE HYDROCHLORIDE 5 MG/1
5 TABLET ORAL
Status: DISCONTINUED | OUTPATIENT
Start: 2025-03-26 | End: 2025-03-26 | Stop reason: HOSPADM

## 2025-03-26 RX ORDER — PROMETHAZINE HYDROCHLORIDE 25 MG/1
25 SUPPOSITORY RECTAL ONCE AS NEEDED
Status: DISCONTINUED | OUTPATIENT
Start: 2025-03-26 | End: 2025-03-26 | Stop reason: HOSPADM

## 2025-03-26 RX ORDER — QUETIAPINE FUMARATE 100 MG/1
100 TABLET, FILM COATED ORAL NIGHTLY PRN
Status: DISCONTINUED | OUTPATIENT
Start: 2025-03-26 | End: 2025-03-27 | Stop reason: HOSPADM

## 2025-03-26 RX ORDER — ACETAMINOPHEN 500 MG
1000 TABLET ORAL ONCE
Status: COMPLETED | OUTPATIENT
Start: 2025-03-26 | End: 2025-03-26

## 2025-03-26 RX ORDER — SODIUM CHLORIDE, SODIUM LACTATE, POTASSIUM CHLORIDE, CALCIUM CHLORIDE 600; 310; 30; 20 MG/100ML; MG/100ML; MG/100ML; MG/100ML
20 INJECTION, SOLUTION INTRAVENOUS CONTINUOUS PRN
Status: DISCONTINUED | OUTPATIENT
Start: 2025-03-26 | End: 2025-03-26 | Stop reason: HOSPADM

## 2025-03-26 RX ORDER — PROPOFOL 10 MG/ML
VIAL (ML) INTRAVENOUS CONTINUOUS PRN
Status: DISCONTINUED | OUTPATIENT
Start: 2025-03-26 | End: 2025-03-26 | Stop reason: SURG

## 2025-03-26 RX ADMIN — NYSTATIN: 100000 POWDER TOPICAL at 10:05

## 2025-03-26 RX ADMIN — TERIPARATIDE 20 MCG: 250 INJECTION, SOLUTION SUBCUTANEOUS at 10:13

## 2025-03-26 RX ADMIN — NYSTATIN: 100000 POWDER TOPICAL at 22:06

## 2025-03-26 RX ADMIN — SODIUM CHLORIDE 3.38 G: 9 INJECTION, SOLUTION INTRAVENOUS at 22:06

## 2025-03-26 RX ADMIN — IPRATROPIUM BROMIDE AND ALBUTEROL SULFATE 3 ML: .5; 3 SOLUTION RESPIRATORY (INHALATION) at 00:49

## 2025-03-26 RX ADMIN — PROPOFOL 250 MCG/KG/MIN: 10 INJECTION, EMULSION INTRAVENOUS at 11:37

## 2025-03-26 RX ADMIN — Medication 10 ML: at 10:05

## 2025-03-26 RX ADMIN — Medication 250 MG: at 10:04

## 2025-03-26 RX ADMIN — CARIPRAZINE 3 MG: 1.5 CAPSULE, GELATIN COATED ORAL at 10:04

## 2025-03-26 RX ADMIN — POTASSIUM CHLORIDE 40 MEQ: 750 CAPSULE, EXTENDED RELEASE ORAL at 10:04

## 2025-03-26 RX ADMIN — BUPROPION HYDROCHLORIDE 150 MG: 150 TABLET, EXTENDED RELEASE ORAL at 10:04

## 2025-03-26 RX ADMIN — ACETAMINOPHEN 1000 MG: 500 TABLET ORAL at 10:56

## 2025-03-26 RX ADMIN — ESCITALOPRAM OXALATE 10 MG: 10 TABLET ORAL at 10:04

## 2025-03-26 RX ADMIN — MIDAZOLAM HYDROCHLORIDE 2 MG: 1 INJECTION, SOLUTION INTRAMUSCULAR; INTRAVENOUS at 11:05

## 2025-03-26 RX ADMIN — Medication 10 ML: at 22:07

## 2025-03-26 RX ADMIN — METOCLOPRAMIDE 10 MG: 5 INJECTION, SOLUTION INTRAMUSCULAR; INTRAVENOUS at 11:08

## 2025-03-26 RX ADMIN — PANTOPRAZOLE SODIUM 40 MG: 40 TABLET, DELAYED RELEASE ORAL at 10:04

## 2025-03-26 RX ADMIN — OXCARBAZEPINE 150 MG: 150 TABLET, FILM COATED ORAL at 10:04

## 2025-03-26 RX ADMIN — HEPARIN SODIUM 5000 UNITS: 5000 INJECTION INTRAVENOUS; SUBCUTANEOUS at 21:51

## 2025-03-26 RX ADMIN — IPRATROPIUM BROMIDE AND ALBUTEROL SULFATE 3 ML: .5; 3 SOLUTION RESPIRATORY (INHALATION) at 06:31

## 2025-03-26 RX ADMIN — OXCARBAZEPINE 150 MG: 150 TABLET, FILM COATED ORAL at 21:51

## 2025-03-26 RX ADMIN — Medication 250 MG: at 21:51

## 2025-03-26 RX ADMIN — POTASSIUM CHLORIDE 40 MEQ: 750 CAPSULE, EXTENDED RELEASE ORAL at 16:37

## 2025-03-26 RX ADMIN — SODIUM CHLORIDE 3.38 G: 9 INJECTION, SOLUTION INTRAVENOUS at 01:53

## 2025-03-26 RX ADMIN — LIDOCAINE HYDROCHLORIDE 80 MG: 20 INJECTION, SOLUTION INTRAVENOUS at 11:37

## 2025-03-26 RX ADMIN — PIPERACILLIN AND TAZOBACTAM 4.5 G: 4; .5 INJECTION, POWDER, FOR SOLUTION INTRAVENOUS at 11:54

## 2025-03-26 RX ADMIN — MIRTAZAPINE 15 MG: 15 TABLET, FILM COATED ORAL at 21:51

## 2025-03-26 RX ADMIN — SODIUM CHLORIDE: 9 INJECTION, SOLUTION INTRAVENOUS at 11:37

## 2025-03-26 RX ADMIN — VANCOMYCIN HYDROCHLORIDE 1250 MG: 5 INJECTION, POWDER, LYOPHILIZED, FOR SOLUTION INTRAVENOUS at 10:05

## 2025-03-26 NOTE — SIGNIFICANT NOTE
03/26/25 1115   Physical Therapy Time and Intention   Session Not Performed patient unavailable for treatment  (With nursing recieving blood transfusion, per nursing headed down for procedure shortly.)

## 2025-03-26 NOTE — PLAN OF CARE
Goal Outcome Evaluation:           Progress: no change  Outcome Evaluation: Patient A&O x4. VS stable. No c/o pain or discomfort on shift. Medications administered per MAR. NPO since midnight for procedure today. CHG bath completed. Hgb 6.9 this morning. PA notified and new orders received. No other changes to report on shift. All needs met at this time. Plan of care ongoing.

## 2025-03-26 NOTE — PROGRESS NOTES
[facility]   Urology Progress Note    Patient Name: Vaishali Griffin  : 1970  MRN: [id number]  Primary Care Physician:  Elisabeth Potter APRN  Date of admission: 3/20/2025    Subjective   Subjective       Feeling some better  No events    Objective   Objective     Vitals:   Temp:  [97.3 °F (36.3 °C)-98.4 °F (36.9 °C)] 98.4 °F (36.9 °C)  Heart Rate:  [] 68  Resp:  [16-19] 18  BP: (104-111)/(46-57) 104/55  Flow (L/min) (Oxygen Therapy):  [0] 0  Physical Exam     Alert and oriented x3  No acute distress  Unlabored respirations  Nontender/nondistended       Result Review    Result Review:  I have personally reviewed the results from the time of this admission to 3/26/2025 06:12 EDT and agree with these findings:  []  Laboratory  []  Microbiology  []  Radiology  []  EKG/Telemetry   []  Cardiology/Vascular   []  Pathology  []  Old records  []  Other:      Assessment & Plan   Assessment / Plan     Brief Patient Summary:  Vaishali Griffin is a 55 y.o. 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  We also discussed the risk of anesthesia up to and including death.  Patient voiced understanding and would like to proceed.    N.p.o.     Can go out whenever thought medically ready after stent exchange

## 2025-03-26 NOTE — ANESTHESIA PREPROCEDURE EVALUATION
Anesthesia Evaluation     Patient summary reviewed and Nursing notes reviewed   no history of anesthetic complications:   NPO Solid Status: > 8 hours  NPO Liquid Status: > 2 hours           Airway   Mallampati: I  TM distance: >3 FB  Neck ROM: full  No difficulty expected  Dental    (+) edentulous    Pulmonary - normal exam    breath sounds clear to auscultation  (+) COPD,  Cardiovascular - negative cardio ROS and normal exam  Exercise tolerance: good (4-7 METS)    Rhythm: regular  Rate: normal        Neuro/Psych  (+) psychiatric history Anxiety and Depression  GI/Hepatic/Renal/Endo    (+) renal disease (Hydronephrosis, recent uti)-    Musculoskeletal     (+) back pain  Abdominal    Substance History - negative use     OB/GYN negative ob/gyn ROS         Other      history of cancer (History of stage IA2 grade 1 squamous cell carcinoma the cervix -Continue to monitor hemoglobin.)    ROS/Med Hx Other: PAT Nursing Notes unavailable.               Latest Reference Range & Units 03/26/25 05:51   Sodium 136 - 145 mmol/L 145   Potassium 3.5 - 5.2 mmol/L 3.3 (L)   Chloride 98 - 107 mmol/L 113 (H)   CO2 22.0 - 29.0 mmol/L 18.6 (L)   Anion Gap 5.0 - 15.0 mmol/L 13.4   BUN 6 - 20 mg/dL 13   Creatinine 0.57 - 1.00 mg/dL 1.03 (H)   BUN/Creatinine Ratio 7.0 - 25.0  12.6   eGFR >60.0 mL/min/1.73 65.2   Glucose 65 - 99 mg/dL 94   Calcium 8.6 - 10.5 mg/dL 8.1 (L)   (L): Data is abnormally low  (H): Data is abnormally high     IMPRESSION:  ct scan 3/20/25  1.Bilateral ureteral stents are again identified. These have likely been replaced since the prior study and are more proximally located with the left in the renal pelvis and on the right near the UPJ. Despite the stents, there is persistent moderate   bilateral hydronephrosis and hydroureter that is similar to the previous study. There is stranding around both kidneys in addition to stranding along the courses of both ureters. Ascending urinary tract infection is not  excluded.  2.Colonic diverticulosis without diverticulitis. Normal appendix. No bowel obstruction.  3.Additional chronic findings as described above.    Ultrasound 3/20/25    IMPRESSION:  Impression:  1.Cholelithiasis without definite sonographic evidence of acute cholecystitis.  2.Moderate right-sided hydronephrosis.    No uterus per CT scan (? Hysterectomy in past)        Anesthesia Plan    ASA 3     general     (Patient understands anesthesia not responsible for dental damage.    Discussed case w/ Dr. Waters, can proceed w/ TIVA/MAC w/ spontaneous respirations if pt tolerates, quick case per surgeon    Pt receiving 1 unit prbc prior to transport, right sided hydronephrosis despite stents already in place    )  intravenous induction     Anesthetic plan, risks, benefits, and alternatives have been provided, discussed and informed consent has been obtained with: patient.    Use of blood products discussed with patient .    Plan discussed with CRNA.        CODE STATUS:    Code Status (Patient has no pulse and is not breathing): CPR (Attempt to Resuscitate)  Medical Interventions (Patient has pulse or is breathing): Full Support  Level Of Support Discussed With: Patient

## 2025-03-26 NOTE — PROGRESS NOTES
[facility]   Hospitalist Progress Note  Date: 3/26/2025  Patient Name: Vaishali Griffin  : 1970  MRN: [id number]  Date of admission: 3/20/2025  Room/Bed: 360/1      Subjective   Subjective     Chief Complaint: Weakness and confusion    Summary:    Vaishali Case is a 55 y.o. female with COPD, anxiety/depression, recurrent UTIs, obesity and GERD that presented to ED due to weakness, confusion and dysuria.  Urinalysis consistent with UTI.  Creatinine elevated compared to baseline.  CT abdomen showed appropriately placed ureteral stents bilaterally but despite stenting patient still had bilateral hydronephrosis.  Empiric antibiotics started.  Urology consulted.  Stent exchange done on 3/26.  PT recommended subacute rehab.     Interval Followup:   Patient is without new complaints.     All systems reviewed and negative except for what is outlined above.      Objective   Objective     Vitals:   Temp:  [97.3 °F (36.3 °C)-98.7 °F (37.1 °C)] 97.7 °F (36.5 °C)  Heart Rate:  [] 72  Resp:  [16-24] 16  BP: ()/(50-72) 105/62    Physical Exam   General: NAD  Cardiovascular: Regular rate and rhythm  Pulmonary: No accessory muscle use  Psych: Mood and affect appropriate    Result Review    Result Review:  I have personally reviewed these results:  [x]  Laboratory      Lab 25  1431 25  0551 25  0455 25  0436 25  0523 25  0111   WBC  --  6.66 7.38 7.73   < > 10.36   HEMOGLOBIN 8.2* 6.9* 7.5* 7.2*   < > 9.4*   HEMATOCRIT 26.5* 21.9* 23.9* 22.0*   < > 31.1*   PLATELETS  --  544* 536* 448   < > 234   NEUTROS ABS  --   --   --   --   --  8.49*   IMMATURE GRANS (ABS)  --   --   --   --   --  0.13*   LYMPHS ABS  --   --   --   --   --  0.56*   MONOS ABS  --   --   --   --   --  1.14*   EOS ABS  --   --   --   --   --  0.00   MCV  --  79.3 78.6* 78.0*   < > 79.7   PROCALCITONIN  --   --   --   --   --  1.40*   LACTATE  --   --   --   --   --  1.6    < > = values in this interval not  displayed.         Lab 03/26/25  0551 03/25/25  0455 03/24/25  0436 03/23/25  0503 03/22/25  0521 03/21/25  0523 03/20/25  0111   SODIUM 145 142 140   < > 138   < > 131*   POTASSIUM 3.3* 3.6 3.3*   < > 3.2*   < > 3.6   CHLORIDE 113* 114* 112*   < > 107   < > 96*   CO2 18.6* 17.5* 18.1*   < > 19.1*   < > 20.5*   ANION GAP 13.4 10.5 9.9   < > 11.9   < > 14.5   BUN 13 18 22*   < > 41*   < > 49*   CREATININE 1.03* 1.22* 1.06*   < > 1.53*   < > 1.93*   EGFR 65.2 53.2* 62.9   < > 40.5*   < > 30.7*   GLUCOSE 94 106* 105*   < > 129*   < > 142*   CALCIUM 8.1* 8.8 8.8   < > 8.6   < > 9.5   MAGNESIUM  --   --  1.9  --  2.2  --   --    PHOSPHORUS  --   --   --   --  2.3*  --   --    HEMOGLOBIN A1C  --   --   --   --   --   --  5.80*    < > = values in this interval not displayed.         Lab 03/21/25  0523 03/20/25  0111   TOTAL PROTEIN 7.0 8.0   ALBUMIN 2.8* 3.3*   GLOBULIN 4.2 4.7   ALT (SGPT) 94* 123*   AST (SGOT) 113* 201*   BILIRUBIN 0.5 0.4   ALK PHOS 172* 189*   LIPASE  --  30                 Lab 03/26/25  0653   ABO TYPING O   RH TYPING Positive   ANTIBODY SCREEN Negative         Brief Urine Lab Results  (Last result in the past 365 days)        Color   Clarity   Blood   Leuk Est   Nitrite   Protein   CREAT   Urine HCG        03/20/25 0111 Dark Yellow   Turbid   Large (3+)   Large (3+)   Negative   100 mg/dL (2+)                 [x]  Microbiology   Microbiology Results (last 10 days)       Procedure Component Value - Date/Time    Blood Culture - Blood, Arm, Right [[id number]]  (Normal) Collected: 03/20/25 3773    Lab Status: Final result Specimen: Blood from Arm, Right Updated: 03/25/25 1900     Blood Culture No growth at 5 days    Blood Culture - Blood, Hand, Right [[id number]]  (Normal) Collected: 03/20/25 1839    Lab Status: Final result Specimen: Blood from Hand, Right Updated: 03/25/25 1900     Blood Culture No growth at 5 days    COVID-19, FLU A/B, RSV PCR 1 HR TAT - Swab, Nasopharynx [[id number]]  (Normal)  Collected: 03/20/25 0143    Lab Status: Final result Specimen: Swab from Nasopharynx Updated: 03/20/25 0233     COVID19 Not Detected     Influenza A PCR Not Detected     Influenza B PCR Not Detected     RSV, PCR Not Detected    Narrative:      Fact sheet for providers: https://www.fda.gov/media/768804/download    Fact sheet for patients: https://www.fda.gov/media/957556/download    Test performed by PCR.    Blood Culture - Blood, Arm, Right [[id number]]  (Normal) Collected: 03/20/25 0125    Lab Status: Final result Specimen: Blood from Arm, Right Updated: 03/25/25 0130     Blood Culture No growth at 5 days    Narrative:      Less than seven (7) mL's of blood was collected.  Insufficient quantity may yield false negative results.    Blood Culture - Blood, Arm, Left [[id number]]  (Normal) Collected: 03/20/25 0125    Lab Status: Final result Specimen: Blood from Arm, Left Updated: 03/25/25 0130     Blood Culture No growth at 5 days    Narrative:      Less than seven (7) mL's of blood was collected.  Insufficient quantity may yield false negative results.    Urine Culture - Urine, Urine, Clean Catch [[id number]] Collected: 03/20/25 0111    Lab Status: Final result Specimen: Urine, Clean Catch Updated: 03/21/25 1132     Urine Culture >100,000 CFU/mL Normal Urogenital Chen    Narrative:      Colonization of the urinary tract without infection is common. Treatment is discouraged unless the patient is symptomatic, pregnant, or undergoing an invasive urologic procedure.          [x]  Radiology  US Liver  Result Date: 3/20/2025  Impression: 1.Cholelithiasis without definite sonographic evidence of acute cholecystitis. 2.Moderate right-sided hydronephrosis. Electronically Signed: Bernabe Calvert MD  3/20/2025 11:01 AM EDT  Workstation ID: [id number]    CT Abdomen Pelvis Without Contrast  Result Date: 3/20/2025  1.Bilateral ureteral stents are again identified. These have likely been replaced since the prior study and are more  proximally located with the left in the renal pelvis and on the right near the UPJ. Despite the stents, there is persistent moderate bilateral hydronephrosis and hydroureter that is similar to the previous study. There is stranding around both kidneys in addition to stranding along the courses of both ureters. Ascending urinary tract infection is not excluded. 2.Colonic diverticulosis without diverticulitis. Normal appendix. No bowel obstruction. 3.Additional chronic findings as described above. Electronically Signed: Joce Joyner MD  3/20/2025 3:25 AM EDT  Workstation ID: [id number]    []  EKG/Telemetry   []  Cardiology/Vascular   []  Pathology  []  Old records  []  Other:    Assessment & Plan        Assessment and Plan:    #Recurrent UTI due to unknown infectious organism  #Chronic Bilateral ureteral obstruction with chronic indwelling ureteral stent  -Urology following, appreciate recommendations  -Continue Zosyn  -Culture showed normal kodak  -Stent exchange done on 3/26    #History of stage IA2 grade 1 squamous cell carcinoma the cervix  -Patient received 1 unit of packed red blood cells on 3/26    #Hypokalemia  Replete    #Anxiety  Lexapro  Wellbutrin  Remeron  Trileptal  Vraylar    #COPD, not acute exacerbate  DuoNeb    #GERD  Pantoprazole         DVT Prophylaxis: Heparin     Discussed with RN.    VTE Prophylaxis:  Pharmacologic & mechanical VTE prophylaxis orders are present.        CODE STATUS:   Code Status (Patient has no pulse and is not breathing): CPR (Attempt to Resuscitate)  Medical Interventions (Patient has pulse or is breathing): Full Support  Level Of Support Discussed With: Patient      Electronically signed by Fili Stewart MD, 3/26/2025, 15:38 EDT.

## 2025-03-26 NOTE — PLAN OF CARE
Goal Outcome Evaluation:  Plan of Care Reviewed With: patient        Progress: improving  Outcome Evaluation: patient alert and oriented x4, VSS, patient had stents replaced today, started backon diet, will be able to discharge tomorrow after urology clears patient, denies pain or needs, will continue with plan of care

## 2025-03-26 NOTE — OP NOTE
CYSTOSCOPY URETERAL CATHETER/STENT INSERTION  Procedure Report    Patient Name:  Vaishali Griffin  YOB: 1970    Date of Surgery:  3/26/2025      Pre-op Diagnosis:   Recurrent UTI [N39.0]  Obstruction of both ureters [N13.5]       Postop diagnosis:    Same    Procedure/CPT® Codes:  SC CYSTO W/INSERT URETERAL STENT [15667]    Procedure(s):    Cystoscopy with bilateral ureteral  stent exchange    6 x 26 black silicone stent      Staff:  Surgeon(s):  Giancarlo Waters MD    Assistant: Kalin Allen RN CSA    Anesthesia: Monitored Anesthesia Care    Estimated Blood Loss: 0 mL    Implants:    Implant Name Type Inv. Item Serial No.  Lot No. LRB No. Used Action   ST STNT URETRL ARMANDO FILFRM DBLPIG RO 6F 26CM BLK - [id number] Implant ST STNT URETRL ARMANDO FILFRM DBLPIG RO 6F 26CM BLK  [employer] [id number] Left 1 Implanted   ST STNT URETRL ARMANDO FILFRM DBLPIG RO 6F 26CM BLK - [id number] Implant ST STNT URETRL ARMANDO FILFRM DBLPIG RO 6F 26CM BLK  [employer] [id number] Left 1 Implanted       Specimen:          None        Findings:       Moderate to heavy trabeculation of the bladder.  No mucosal abnormalities, no pathology    6 x 26 black silicone stents x 2      Complications: none    Description of Procedure:        After informed consent patient taken to the operating room.  Patient was laid supine and placed under general anesthesia by the anesthesia team.  At this point patient was placed in dorsal lithotomy position and prepped and draped in normal sterile fashion.  A multidisciplinary timeout was undertaken documenting the correct patient site and procedure.  At this point a 22 rigid cystoscope was placed into the urethra . Bladder was examined, see findings section    Left stent was grasped with stent graspers and brought to urethral meatus.  Glidewire placed up without issue under fluoroscopic guidance all stent was removed.    A new 6 x 26 black silicone stent was placed up under fluoroscopic guidance without  issue, no string    Good curl in the bladder and also good curl in the left renal pelvis under fluoroscopy      At this point I went and term attention to the right side.  Stent was brought to urethral meatus with a grasper, Glidewire passed up into the right kidney with fluoroscopic guidance.    Old stent removed.    New black silicone stent 6 x 26 placed in the right ureter under fluoroscopic guidance.  Good curl in the renal pelvis and a good curl in the bladder with under fluoroscopy   Bladder was drained.  Patient tolerated the procedure well, he was taken to the postanesthesia care unit without issue.      No strings left on stents    Assistant: Kalin Allen RN CSA  was responsible for performing the following activities: Held/Positioned Camera and their skilled assistance was necessary for the success of this case.    Giancarlo Waters MD     Date: 3/26/2025  Time: 12:10 EDT

## 2025-03-26 NOTE — ANESTHESIA POSTPROCEDURE EVALUATION
Patient: Vaishali Case    Procedure Summary       Date: 03/26/25 Room / Location: [facility] OR  / [facility] MAIN OR    Anesthesia Start: 1137 Anesthesia Stop: 1215    Procedure: Cystoscopy with bilateral ureteral  stent exchange, look in bladder, change tubes in both kidneys (Bilateral) Diagnosis:       Recurrent UTI      Obstruction of both ureters      (Recurrent UTI [N39.0])      (Obstruction of both ureters [N13.5])    Surgeons: Giancarlo Waters MD Provider: Johnny Wilkerson MD    Anesthesia Type: general ASA Status: 3            Anesthesia Type: general    Vitals  Vitals Value Taken Time   BP 97/62 03/26/25 12:27   Temp 36.8 °C (98.2 °F) 03/26/25 12:17   Pulse 74 03/26/25 12:28   Resp 24 03/26/25 12:23   SpO2 91 % 03/26/25 12:28   Vitals shown include unfiled device data.        Post Anesthesia Care and Evaluation    Patient location during evaluation: bedside  Patient participation: complete - patient participated  Level of consciousness: awake  Pain score: 2  Pain management: adequate    Airway patency: patent  Anesthetic complications: No anesthetic complications  PONV Status: none  Cardiovascular status: acceptable and stable  Respiratory status: acceptable  Hydration status: acceptable

## 2025-03-26 NOTE — PROGRESS NOTES
Enter Query Response Below      Query Response:     UTI related to ureteral stent  Electronically signed by Giancarlo Waters MD, 03/26/25, 3:32 PM EDT.              If applicable, please update the problem list.

## 2025-03-26 NOTE — PROGRESS NOTES
Enter Query Response Below      Query Response: Acute kidney injury (RADHA) organ dysfunction due to UTI             If applicable, please update the problem list.

## 2025-03-26 NOTE — DISCHARGE SUMMARY
[facility]         HOSPITALIST  DISCHARGE SUMMARY    Patient Name: Vaishali Griffin  : 1970  MRN: [id number]    Date of Admission: 3/20/2025  Date of Discharge:  3/27/25  Primary Care Physician: Elisabeth Potter APRN    Consults       Date and Time Order Name Status Description    3/21/2025  7:55 AM Inpatient Urology Consult      3/20/2025  4:03 AM Hospitalist (on-call MD unless specified)              Active and Resolved Hospital Problems:  Active Hospital Problems    Diagnosis POA   • **Generalized weakness [R53.1] Unknown   • AMS (altered mental status) [R41.82] Yes   • Recurrent UTI [N39.0] Unknown   • Obstruction of both ureters [N13.5] Unknown      Resolved Hospital Problems   No resolved problems to display.       Hospital Course     Hospital Course:  Vaishali Griffin is a 55 y.o. female with COPD, anxiety/depression, recurrent UTIs, obesity and GERD that presented to ED due to weakness, confusion and dysuria.  Urinalysis consistent with UTI.  Creatinine elevated compared to baseline.  CT abdomen showed appropriately placed ureteral stents bilaterally but despite stenting patient still had bilateral hydronephrosis.  Empiric antibiotics started. Finished 7 days of Zosyn. Urology consulted for cystoscopy and stent exchange.  Cystoscopy and stent exchange performed on 3/26. Patient's Cr back wnl. Patient's hospital course was complicated by anemia requiring 1 unit of packed red blood cells. Patient does not want rehab. She wants to continue outpatient PT. Patient is medically stable and can discharge home.    DISCHARGE Follow Up Recommendations for labs and diagnostics:   -Follow-up with urology  -Follow-up with PCP      Day of Discharge     Vital Signs:  Temp:  [97.5 °F (36.4 °C)-98.7 °F (37.1 °C)] 98.4 °F (36.9 °C)  Heart Rate:  [60-89] 89  Resp:  [16-24] 20  BP: ()/(51-75) 114/75  Physical Exam:   GEN: NAD  CV: RRR  Pulm: CTAB  GI: soft, nt, nd    Discharge Details        Discharge  Medications        Continue These Medications        Instructions Start Date   buPROPion  MG 24 hr tablet  Commonly known as: WELLBUTRIN XL   1 tablet, Oral, Daily      escitalopram 10 MG tablet  Commonly known as: LEXAPRO   1 tablet, Oral, Daily      Forteo 600 MCG/2.4ML injection  Generic drug: Teriparatide   20 mcg, Subcutaneous, Daily      gabapentin 600 MG tablet  Commonly known as: NEURONTIN   600 mg, Oral, Daily      hydrOXYzine 25 MG tablet  Commonly known as: ATARAX   1-2 tablets, Oral, 3 Times Daily PRN      ibuprofen 800 MG tablet  Commonly known as: ADVIL,MOTRIN   800 mg, Oral, Every 8 Hours PRN      mirtazapine 15 MG tablet  Commonly known as: REMERON   15 mg, Oral, Nightly PRN, for sleep      OXcarbazepine 150 MG tablet  Commonly known as: TRILEPTAL   1 tablet, Oral, Every 12 Hours Scheduled      oxyCODONE-acetaminophen  MG per tablet  Commonly known as: PERCOCET   1 tablet, Oral, Every 6 Hours PRN      pantoprazole 40 MG EC tablet  Commonly known as: PROTONIX   40 mg, Oral, Daily      QUEtiapine 100 MG tablet  Commonly known as: SEROquel   100 mg, Oral, Nightly PRN, for sleep      Vraylar 3 MG capsule capsule  Generic drug: Cariprazine HCl   1 capsule, Oral, Daily               No Known Allergies    Discharge Disposition:  Home or Self Care    Diet:  Hospital:  Diet Order   Procedures   • Diet: Renal; Low Potassium, Low Sodium (2-3g); Fluid Consistency: Thin (IDDSI 0)       Discharge Activity:   Activity Instructions       Activity as Tolerated              CODE STATUS:  Code Status and Medical Interventions: CPR (Attempt to Resuscitate); Full Support   Ordered at: 03/20/25 0612     Code Status (Patient has no pulse and is not breathing):    CPR (Attempt to Resuscitate)     Medical Interventions (Patient has pulse or is breathing):    Full Support     Level Of Support Discussed With:    Patient         No future appointments.    Additional Instructions for the Follow-ups that You Need to  Schedule       Discharge Follow-up with PCP   As directed       Currently Documented PCP:    Elisabeth Potter APRN    PCP Phone Number:    [phone]     Follow Up Details: 1 week                Pertinent  and/or Most Recent Results     PROCEDURES:   Stent exchanged by urology on 3/26    LAB RESULTS:      Lab 03/27/25  0556 03/26/25  1431 03/26/25  0551 03/25/25  0455 03/24/25  0436 03/22/25  0521   WBC 6.45  --  6.66 7.38 7.73 9.97   HEMOGLOBIN 8.7* 8.2* 6.9* 7.5* 7.2* 7.6*   HEMATOCRIT 29.4* 26.5* 21.9* 23.9* 22.0* 23.3*   PLATELETS 539*  --  544* 536* 448 304   MCV 86.0  --  79.3 78.6* 78.0* 76.9*         Lab 03/27/25  0556 03/26/25  0551 03/25/25  0455 03/24/25  0436 03/23/25  0503 03/22/25  0521   SODIUM 139 145 142 140 142 138   POTASSIUM 3.7 3.3* 3.6 3.3* 3.2* 3.2*   CHLORIDE 112* 113* 114* 112* 110* 107   CO2 17.0* 18.6* 17.5* 18.1* 18.6* 19.1*   ANION GAP 10.0 13.4 10.5 9.9 13.4 11.9   BUN 10 13 18 22* 29* 41*   CREATININE 1.08* 1.03* 1.22* 1.06* 1.35* 1.53*   EGFR 61.5 65.2 53.2* 62.9 47.1* 40.5*   GLUCOSE 104* 94 106* 105* 130* 129*   CALCIUM 8.2* 8.1* 8.8 8.8 8.5* 8.6   MAGNESIUM  --   --   --  1.9  --  2.2   PHOSPHORUS  --   --   --   --   --  2.3*         Lab 03/21/25  0523   TOTAL PROTEIN 7.0   ALBUMIN 2.8*   GLOBULIN 4.2   ALT (SGPT) 94*   AST (SGOT) 113*   BILIRUBIN 0.5   ALK PHOS 172*                 Lab 03/26/25  0653   ABO TYPING O   RH TYPING Positive   ANTIBODY SCREEN Negative         Brief Urine Lab Results  (Last result in the past 365 days)        Color   Clarity   Blood   Leuk Est   Nitrite   Protein   CREAT   Urine HCG        03/20/25 0111 Dark Yellow   Turbid   Large (3+)   Large (3+)   Negative   100 mg/dL (2+)                 Microbiology Results (last 10 days)       Procedure Component Value - Date/Time    Blood Culture - Blood, Arm, Right [[id number]]  (Normal) Collected: 03/20/25 1839    Lab Status: Final result Specimen: Blood from Arm, Right Updated: 03/25/25 1900     Blood  Culture No growth at 5 days    Blood Culture - Blood, Hand, Right [[id number]]  (Normal) Collected: 03/20/25 1839    Lab Status: Final result Specimen: Blood from Hand, Right Updated: 03/25/25 1900     Blood Culture No growth at 5 days    COVID-19, FLU A/B, RSV PCR 1 HR TAT - Swab, Nasopharynx [[id number]]  (Normal) Collected: 03/20/25 0143    Lab Status: Final result Specimen: Swab from Nasopharynx Updated: 03/20/25 0233     COVID19 Not Detected     Influenza A PCR Not Detected     Influenza B PCR Not Detected     RSV, PCR Not Detected    Narrative:      Fact sheet for providers: https://www.fda.gov/media/403633/download    Fact sheet for patients: https://www.fda.gov/media/443173/download    Test performed by PCR.    Blood Culture - Blood, Arm, Right [[id number]]  (Normal) Collected: 03/20/25 0125    Lab Status: Final result Specimen: Blood from Arm, Right Updated: 03/25/25 0130     Blood Culture No growth at 5 days    Narrative:      Less than seven (7) mL's of blood was collected.  Insufficient quantity may yield false negative results.    Blood Culture - Blood, Arm, Left [[id number]]  (Normal) Collected: 03/20/25 0125    Lab Status: Final result Specimen: Blood from Arm, Left Updated: 03/25/25 0130     Blood Culture No growth at 5 days    Narrative:      Less than seven (7) mL's of blood was collected.  Insufficient quantity may yield false negative results.    Urine Culture - Urine, Urine, Clean Catch [[id number]] Collected: 03/20/25 0111    Lab Status: Final result Specimen: Urine, Clean Catch Updated: 03/21/25 1132     Urine Culture >100,000 CFU/mL Normal Urogenital [facility]    Narrative:      Colonization of the urinary tract without infection is common. Treatment is discouraged unless the patient is symptomatic, pregnant, or undergoing an invasive urologic procedure.            US Liver  Result Date: 3/20/2025  Impression: 1.Cholelithiasis without definite sonographic evidence of acute cholecystitis. 2.Moderate  right-sided hydronephrosis. Electronically Signed: Bernabe Calvert MD  3/20/2025 11:01 AM EDT  Workstation ID: [id number]    CT Abdomen Pelvis Without Contrast  Result Date: 3/20/2025  1.Bilateral ureteral stents are again identified. These have likely been replaced since the prior study and are more proximally located with the left in the renal pelvis and on the right near the UPJ. Despite the stents, there is persistent moderate bilateral hydronephrosis and hydroureter that is similar to the previous study. There is stranding around both kidneys in addition to stranding along the courses of both ureters. Ascending urinary tract infection is not excluded. 2.Colonic diverticulosis without diverticulitis. Normal appendix. No bowel obstruction. 3.Additional chronic findings as described above. Electronically Signed: Joce Joyner MD  3/20/2025 3:25 AM EDT  Workstation ID: [id number]                       Time spent on Discharge including face to face service:  35 minutes    Electronically signed by Fili Stewart MD, 03/26/25, 2:17 PM EDT.

## 2025-03-27 ENCOUNTER — READMISSION MANAGEMENT (OUTPATIENT)
Dept: CALL CENTER | Facility: HOSPITAL | Age: 55
End: 2025-03-27
Payer: COMMERCIAL

## 2025-03-27 VITALS
RESPIRATION RATE: 18 BRPM | HEIGHT: 67 IN | OXYGEN SATURATION: 98 % | WEIGHT: 225.09 LBS | HEART RATE: 78 BPM | TEMPERATURE: 98.4 F | BODY MASS INDEX: 35.33 KG/M2 | SYSTOLIC BLOOD PRESSURE: 124 MMHG | DIASTOLIC BLOOD PRESSURE: 62 MMHG

## 2025-03-27 LAB
ANION GAP SERPL CALCULATED.3IONS-SCNC: 10 MMOL/L (ref 5–15)
BH BB BLOOD EXPIRATION DATE: NORMAL
BH BB BLOOD TYPE BARCODE: 5100
BH BB DISPENSE STATUS: NORMAL
BH BB PRODUCT CODE: NORMAL
BH BB UNIT NUMBER: NORMAL
BUN SERPL-MCNC: 10 MG/DL (ref 6–20)
BUN/CREAT SERPL: 9.3 (ref 7–25)
CALCIUM SPEC-SCNC: 8.2 MG/DL (ref 8.6–10.5)
CHLORIDE SERPL-SCNC: 112 MMOL/L (ref 98–107)
CO2 SERPL-SCNC: 17 MMOL/L (ref 22–29)
CREAT SERPL-MCNC: 1.08 MG/DL (ref 0.57–1)
CROSSMATCH INTERPRETATION: NORMAL
DEPRECATED RDW RBC AUTO: 52.7 FL (ref 37–54)
EGFRCR SERPLBLD CKD-EPI 2021: 61.5 ML/MIN/1.73
ERYTHROCYTE [DISTWIDTH] IN BLOOD BY AUTOMATED COUNT: 17.2 % (ref 12.3–15.4)
GLUCOSE SERPL-MCNC: 104 MG/DL (ref 65–99)
HCT VFR BLD AUTO: 29.4 % (ref 34–46.6)
HGB BLD-MCNC: 8.7 G/DL (ref 12–15.9)
MCH RBC QN AUTO: 25.4 PG (ref 26.6–33)
MCHC RBC AUTO-ENTMCNC: 29.6 G/DL (ref 31.5–35.7)
MCV RBC AUTO: 86 FL (ref 79–97)
PLATELET # BLD AUTO: 539 10*3/MM3 (ref 140–450)
PMV BLD AUTO: 11.2 FL (ref 6–12)
POTASSIUM SERPL-SCNC: 3.7 MMOL/L (ref 3.5–5.2)
RBC # BLD AUTO: 3.42 10*6/MM3 (ref 3.77–5.28)
SODIUM SERPL-SCNC: 139 MMOL/L (ref 136–145)
UNIT  ABO: NORMAL
UNIT  RH: NORMAL
WBC NRBC COR # BLD AUTO: 6.45 10*3/MM3 (ref 3.4–10.8)

## 2025-03-27 PROCEDURE — 94799 UNLISTED PULMONARY SVC/PX: CPT

## 2025-03-27 PROCEDURE — 94664 DEMO&/EVAL PT USE INHALER: CPT

## 2025-03-27 PROCEDURE — 99231 SBSQ HOSP IP/OBS SF/LOW 25: CPT | Performed by: UROLOGY

## 2025-03-27 PROCEDURE — 94760 N-INVAS EAR/PLS OXIMETRY 1: CPT

## 2025-03-27 PROCEDURE — 99239 HOSP IP/OBS DSCHRG MGMT >30: CPT | Performed by: STUDENT IN AN ORGANIZED HEALTH CARE EDUCATION/TRAINING PROGRAM

## 2025-03-27 PROCEDURE — 25010000002 PIPERACILLIN SOD-TAZOBACTAM PER 1 G: Performed by: STUDENT IN AN ORGANIZED HEALTH CARE EDUCATION/TRAINING PROGRAM

## 2025-03-27 PROCEDURE — 85027 COMPLETE CBC AUTOMATED: CPT | Performed by: STUDENT IN AN ORGANIZED HEALTH CARE EDUCATION/TRAINING PROGRAM

## 2025-03-27 PROCEDURE — 25010000002 HEPARIN (PORCINE) PER 1000 UNITS: Performed by: UROLOGY

## 2025-03-27 PROCEDURE — 80048 BASIC METABOLIC PNL TOTAL CA: CPT | Performed by: STUDENT IN AN ORGANIZED HEALTH CARE EDUCATION/TRAINING PROGRAM

## 2025-03-27 RX ADMIN — SODIUM CHLORIDE 3.38 G: 9 INJECTION, SOLUTION INTRAVENOUS at 04:33

## 2025-03-27 RX ADMIN — OXCARBAZEPINE 150 MG: 150 TABLET, FILM COATED ORAL at 09:05

## 2025-03-27 RX ADMIN — CARIPRAZINE 3 MG: 1.5 CAPSULE, GELATIN COATED ORAL at 08:23

## 2025-03-27 RX ADMIN — PANTOPRAZOLE SODIUM 40 MG: 40 TABLET, DELAYED RELEASE ORAL at 08:22

## 2025-03-27 RX ADMIN — TERIPARATIDE 20 MCG: 250 INJECTION, SOLUTION SUBCUTANEOUS at 08:22

## 2025-03-27 RX ADMIN — Medication 10 ML: at 08:23

## 2025-03-27 RX ADMIN — HEPARIN SODIUM 5000 UNITS: 5000 INJECTION INTRAVENOUS; SUBCUTANEOUS at 08:22

## 2025-03-27 RX ADMIN — IPRATROPIUM BROMIDE AND ALBUTEROL SULFATE 3 ML: .5; 3 SOLUTION RESPIRATORY (INHALATION) at 07:15

## 2025-03-27 RX ADMIN — Medication 250 MG: at 08:23

## 2025-03-27 RX ADMIN — NYSTATIN: 100000 POWDER TOPICAL at 09:06

## 2025-03-27 RX ADMIN — BUPROPION HYDROCHLORIDE 150 MG: 150 TABLET, EXTENDED RELEASE ORAL at 08:22

## 2025-03-27 RX ADMIN — ESCITALOPRAM OXALATE 10 MG: 10 TABLET ORAL at 08:22

## 2025-03-27 NOTE — PLAN OF CARE
Went over discharge instructions with patient, verbalized understanding, verbalized understanding that medications did not change, removed IV, site free from redness or bleeding, all personal belongings returned, returned jewelry and home medications, waiting on ride       Problem: Adult Inpatient Plan of Care  Goal: Plan of Care Review  Outcome: Adequate for Care Transition  Goal: Patient-Specific Goal (Individualized)  Outcome: Adequate for Care Transition  Goal: Absence of Hospital-Acquired Illness or Injury  Outcome: Adequate for Care Transition  Intervention: Identify and Manage Fall Risk  Recent Flowsheet Documentation  Taken 3/27/2025 1009 by Brea Beck RN  Safety Promotion/Fall Prevention:   safety round/check completed   room organization consistent   nonskid shoes/slippers when out of bed   fall prevention program maintained  Taken 3/27/2025 0905 by Brea Beck RN  Safety Promotion/Fall Prevention:   safety round/check completed   room organization consistent   nonskid shoes/slippers when out of bed   fall prevention program maintained  Taken 3/27/2025 0825 by Brea Beck RN  Safety Promotion/Fall Prevention:   safety round/check completed   room organization consistent   nonskid shoes/slippers when out of bed   fall prevention program maintained  Taken 3/27/2025 0743 by Brea Beck RN  Safety Promotion/Fall Prevention:   safety round/check completed   room organization consistent   nonskid shoes/slippers when out of bed   fall prevention program maintained  Intervention: Prevent Skin Injury  Recent Flowsheet Documentation  Taken 3/27/2025 1009 by Brea Beck RN  Body Position: side-lying  Taken 3/27/2025 0905 by Brea Beck RN  Body Position:   side-lying   right  Taken 3/27/2025 0825 by Brea Beck RN  Body Position: dangle, side of bed  Taken 3/27/2025 0743 by Brea Beck RN  Body Position: sitting up in bed  Skin Protection: incontinence pads  utilized  Intervention: Prevent and Manage VTE (Venous Thromboembolism) Risk  Recent Flowsheet Documentation  Taken 3/27/2025 0743 by Brea Beck RN  VTE Prevention/Management:   bilateral   SCDs (sequential compression devices) off  Intervention: Prevent Infection  Recent Flowsheet Documentation  Taken 3/27/2025 1009 by Brea Beck RN  Infection Prevention:   hand hygiene promoted   personal protective equipment utilized   rest/sleep promoted   single patient room provided  Taken 3/27/2025 0905 by Brea Beck RN  Infection Prevention:   hand hygiene promoted   personal protective equipment utilized   rest/sleep promoted   single patient room provided  Taken 3/27/2025 0825 by Brea Beck RN  Infection Prevention:   hand hygiene promoted   personal protective equipment utilized   rest/sleep promoted   single patient room provided  Taken 3/27/2025 0743 by Brea Beck RN  Infection Prevention:   hand hygiene promoted   personal protective equipment utilized   rest/sleep promoted   single patient room provided  Goal: Optimal Comfort and Wellbeing  Outcome: Adequate for Care Transition  Intervention: Provide Person-Centered Care  Recent Flowsheet Documentation  Taken 3/27/2025 0743 by Brea Beck RN  Trust Relationship/Rapport:   care explained   choices provided   thoughts/feelings acknowledged   questions encouraged   questions answered  Goal: Readiness for Transition of Care  Outcome: Adequate for Care Transition     Problem: Skin Injury Risk Increased  Goal: Skin Health and Integrity  Outcome: Adequate for Care Transition  Intervention: Optimize Skin Protection  Recent Flowsheet Documentation  Taken 3/27/2025 1009 by Brea Beck RN  Activity Management: activity encouraged  Head of Bed (HOB) Positioning: HOB at 20-30 degrees  Taken 3/27/2025 0905 by Brea Beck RN  Activity Management: activity encouraged  Head of Bed (HOB) Positioning: HOB at 20-30 degrees  Taken 3/27/2025  0825 by Beck, Brea, RN  Activity Management: sitting, edge of bed  Head of Bed (HOB) Positioning: HOB at 90 degrees  Taken 3/27/2025 0743 by Brea Beck RN  Activity Management: activity encouraged  Pressure Reduction Techniques: frequent weight shift encouraged  Head of Bed (HOB) Positioning: HOB at 45 degrees  Pressure Reduction Devices: pressure-redistributing mattress utilized  Skin Protection: incontinence pads utilized     Problem: Violence Risk or Actual  Goal: Anger and Impulse Control  Outcome: Adequate for Care Transition  Intervention: Minimize Safety Risk  Recent Flowsheet Documentation  Taken 3/27/2025 1009 by Brea Beck RN  Enhanced Safety Measures: bed alarm set  Taken 3/27/2025 0905 by Brea Beck RN  Enhanced Safety Measures: bed alarm set  Taken 3/27/2025 0825 by Brea Beck RN  Enhanced Safety Measures: bed alarm set  Taken 3/27/2025 0743 by Brea Beck RN  Sensory Stimulation Regulation: care clustered  Enhanced Safety Measures: bed alarm set  Intervention: Promote Self-Control  Recent Flowsheet Documentation  Taken 3/27/2025 0743 by Brea Beck RN  Supportive Measures: active listening utilized  Environmental Support: calm environment promoted     Problem: Fatigue  Goal: Improved Activity Tolerance  Outcome: Adequate for Care Transition  Intervention: Promote Improved Energy  Recent Flowsheet Documentation  Taken 3/27/2025 1009 by Brea Beck RN  Activity Management: activity encouraged  Taken 3/27/2025 0905 by Brea Beck RN  Activity Management: activity encouraged  Taken 3/27/2025 0825 by Brea Beck RN  Activity Management: sitting, edge of bed  Taken 3/27/2025 0743 by Brea Beck RN  Activity Management: activity encouraged  Environmental Support: calm environment promoted

## 2025-03-27 NOTE — PROGRESS NOTES
[facility]   Urology Progress Note    Patient Name: Vaishali Griffin  : 1970  MRN: [id number]  Primary Care Physician:  Elisabeth Potter APRN  Date of admission: 3/20/2025    Subjective   Subjective     No pain      Objective   Objective     Vitals:   Temp:  [97.3 °F (36.3 °C)-98.7 °F (37.1 °C)] 98.4 °F (36.9 °C)  Heart Rate:  [60-93] 72  Resp:  [16-24] 18  BP: ()/(51-72) 102/52  Physical Exam     Alert and oriented x3  No acute distress  Unlabored respirations  Nontender/nondistended       Result Review    Result Review:  I have personally reviewed the results from the time of this admission to 3/27/2025 06:02 EDT and agree with these findings:  []  Laboratory  []  Microbiology  []  Radiology  []  EKG/Telemetry   []  Cardiology/Vascular   []  Pathology  []  Old records  []  Other:      Assessment & Plan   Assessment / Plan     Brief Patient Summary:  Vaishali Griffin is a 55 y.o. 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

## 2025-03-27 NOTE — PLAN OF CARE
Goal Outcome Evaluation:  Plan of Care Reviewed With: patient           Outcome Evaluation: Patient AAOx4 VSS, patient had ureter stents replaced yesterday. Patient should be able to discharge home today after Urology clears her. No complaints of pain this shift. Rested well. Continue plan of care.

## 2025-03-28 NOTE — OUTREACH NOTE
Prep Survey      Flowsheet Row Responses   [facility] facility patient discharged from? [facility]   Is LACE score < 7 ? No   Eligibility Readm Mgmt   Discharge diagnosis CYSTOSCOPY W/ URETERAL STENT PLACEMENT-Generalized weakness   Does the patient have one of the following disease processes/diagnoses(primary or secondary)? Other   Does the patient have Home health ordered? No   Is there a DME ordered? No   General alerts for this patient Caretenders declined   Prep survey completed? Yes            CHARLENE JACKSON - Registered Nurse

## 2025-04-09 ENCOUNTER — READMISSION MANAGEMENT (OUTPATIENT)
Dept: CALL CENTER | Facility: HOSPITAL | Age: 55
End: 2025-04-09
Payer: COMMERCIAL

## 2025-04-09 NOTE — OUTREACH NOTE
Medical Week 2 Survey      Flowsheet Row Responses   [facility] patient discharged from? [facility]   Does the patient have one of the following disease processes/diagnoses(primary or secondary)? Other   Week 2 attempt successful? Yes   Call start time 1030   Discharge diagnosis CYSTOSCOPY W/ URETERAL STENT PLACEMENT-Generalized weakness   Call end time 1031   Person spoke with today (if not patient) and relationship Patient   Meds reviewed with patient/caregiver? Yes   Does the patient have a primary care provider?  Yes   Comments regarding PCP Waiting on a call back from urology   Has home health visited the patient within 72 hours of discharge? N/A   Psychosocial issues? No   Did the patient receive a copy of their discharge instructions? Yes   Nursing interventions Reviewed instructions with patient   What is the patient's perception of their health status since discharge? Improving   Is the patient/caregiver able to teach back signs and symptoms related to disease process for when to call PCP? Yes   Is the patient/caregiver able to teach back signs and symptoms related to disease process for when to call 911? Yes   Is the patient/caregiver able to teach back the hierarchy of who to call/visit for symptoms/problems? PCP, Specialist, Home health nurse, Urgent Care, ED, 911 Yes   Week 2 Call Completed? Yes   Graduated Yes   Wrap up additional comments Patient reports doing well. No concerns or questions noted.   Call end time 1031            Debra MILLS - Registered Nurse

## (undated) DEVICE — GLOVE,SURG,SENSICARE SLT,LF,PF,8: Brand: MEDLINE

## (undated) DEVICE — Device

## (undated) DEVICE — NITINOL WIRE WITH HYDROPHILIC TIP: Brand: SENSOR

## (undated) DEVICE — CYSTO/BLADDER IRRIGATION SET, REGULATING CLAMP

## (undated) DEVICE — SOL IRRG H2O BG 3000ML STRL

## (undated) DEVICE — SKIN PREP TRAY W/CHG: Brand: MEDLINE INDUSTRIES, INC.

## (undated) DEVICE — CATH FOL BARDEX 2WY 20F 30CC

## (undated) DEVICE — THE STERILE LIGHT HANDLE COVER IS USED WITH STERIS SURGICAL LIGHTING AND VISUALIZATION SYSTEMS.

## (undated) DEVICE — GOWN,REINFORCE,POLY,SIRUS,BREATH SLV,XLG: Brand: MEDLINE

## (undated) DEVICE — TOWEL,OR,DSP,ST,BLUE,STD,4/PK,20PK/CS: Brand: MEDLINE

## (undated) DEVICE — GW PTFE FIX/CORE STFF STR .038 3X150CM

## (undated) DEVICE — GOWN,SIRUS,POLYRNF,BRTHSLV,XL,30/CS: Brand: MEDLINE

## (undated) DEVICE — CYSTO PACK: Brand: MEDLINE INDUSTRIES, INC.

## (undated) DEVICE — TUBING, SUCTION, 1/4" X 10', STRAIGHT: Brand: MEDLINE

## (undated) DEVICE — GW ZIPWIRE STD/SHFT STR TPR/3CM .038IN 150CM